# Patient Record
Sex: MALE | Race: WHITE | URBAN - METROPOLITAN AREA
[De-identification: names, ages, dates, MRNs, and addresses within clinical notes are randomized per-mention and may not be internally consistent; named-entity substitution may affect disease eponyms.]

---

## 2019-09-04 ENCOUNTER — DOCUMENTATION ONLY (OUTPATIENT)
Dept: CARE COORDINATION | Facility: CLINIC | Age: 69
End: 2019-09-04

## 2019-09-08 PROBLEM — M79.671 PAIN IN RIGHT FOOT: Status: ACTIVE | Noted: 2018-04-04

## 2019-09-08 PROBLEM — M72.2 PLANTAR FASCIITIS: Status: ACTIVE | Noted: 2018-04-04

## 2019-09-08 PROBLEM — M47.812 SPONDYLOSIS OF CERVICAL REGION WITHOUT MYELOPATHY OR RADICULOPATHY: Status: ACTIVE | Noted: 2017-10-14

## 2019-09-08 RX ORDER — HYDROCODONE BITARTRATE AND ACETAMINOPHEN 5; 325 MG/1; MG/1
1 TABLET ORAL EVERY 6 HOURS PRN
COMMUNITY
Start: 2019-09-06 | End: 2019-09-19

## 2019-09-08 RX ORDER — BUPROPION HYDROCHLORIDE 300 MG/1
150 TABLET ORAL EVERY MORNING
COMMUNITY
Start: 2018-03-27

## 2019-09-08 RX ORDER — DEXTROAMPHETAMINE SACCHARATE, AMPHETAMINE ASPARTATE, DEXTROAMPHETAMINE SULFATE AND AMPHETAMINE SULFATE 5; 5; 5; 5 MG/1; MG/1; MG/1; MG/1
TABLET ORAL
COMMUNITY
Start: 2016-05-02 | End: 2019-09-19

## 2019-09-08 RX ORDER — BUPROPION HYDROCHLORIDE 150 MG/1
150 TABLET ORAL EVERY MORNING
COMMUNITY
Start: 2015-01-23

## 2019-09-08 RX ORDER — OXYBUTYNIN CHLORIDE 10 MG/1
TABLET, EXTENDED RELEASE ORAL
COMMUNITY
Start: 2018-12-11

## 2019-09-08 RX ORDER — DEXTROAMPHETAMINE SACCHARATE, AMPHETAMINE ASPARTATE, DEXTROAMPHETAMINE SULFATE AND AMPHETAMINE SULFATE 7.5; 7.5; 7.5; 7.5 MG/1; MG/1; MG/1; MG/1
1 TABLET ORAL
COMMUNITY
Start: 2019-07-22

## 2019-09-08 RX ORDER — CYANOCOBALAMIN 1000 UG/ML
1000 INJECTION, SOLUTION INTRAMUSCULAR; SUBCUTANEOUS
COMMUNITY
Start: 2017-08-23 | End: 2019-09-19

## 2019-09-08 RX ORDER — ATORVASTATIN CALCIUM 10 MG/1
10 TABLET, FILM COATED ORAL DAILY
COMMUNITY
Start: 2019-03-04 | End: 2020-03-03

## 2019-09-08 RX ORDER — FUROSEMIDE 20 MG
10 TABLET ORAL DAILY
COMMUNITY
Start: 2018-06-18

## 2019-09-08 RX ORDER — LAMOTRIGINE 150 MG/1
200 TABLET ORAL DAILY
COMMUNITY
Start: 2018-01-25

## 2019-09-08 RX ORDER — PHENAZOPYRIDINE HYDROCHLORIDE 100 MG/1
TABLET, FILM COATED ORAL
COMMUNITY
Start: 2018-09-04

## 2019-09-08 RX ORDER — ASCORBIC ACID 500 MG
500 TABLET ORAL DAILY
COMMUNITY
Start: 2016-01-13

## 2019-09-08 RX ORDER — VITAMIN B COMPLEX
1 CAPSULE ORAL
COMMUNITY
End: 2019-09-19

## 2019-09-08 RX ORDER — FOLIC ACID 1 MG/1
TABLET ORAL
COMMUNITY
Start: 2018-12-29

## 2019-09-08 RX ORDER — ROPINIROLE 1 MG/1
TABLET, FILM COATED ORAL
COMMUNITY
Start: 2018-03-27

## 2019-09-08 RX ORDER — FERROUS SULFATE 325(65) MG
65 TABLET ORAL
COMMUNITY
Start: 2015-02-09 | End: 2019-09-19

## 2019-09-08 RX ORDER — FEXOFENADINE HCL 180 MG/1
TABLET ORAL
COMMUNITY
Start: 2018-06-02

## 2019-09-08 NOTE — PROGRESS NOTES
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 Kerr - presumed related to neuropathy  Not clear if he is deficient in vitamin B12 - 259 was his level 2/11/2019 which is normal but low normal.     Restless legs syndrome.    History of alcohol issues    He has fatigue/lack of motivation    He continues to smoke and has had intermittent voice issues - but has not had ENT evaluation  He was encouraged to follow with his pcp    He has past pneumonia and had a followup xray 3/4/2019 that showed clearing of the abnormalities seen January 2019.    Bipolar - and ADHD  Niece Britta Villalobos phone [phone] sets up his medications.     Will need updated list of medications and dosages as we don't have correct information below.     He has presumably tardive movements as they affect his mouth and he has additional movements.     He may have had sleep apnea in the past based on chart review but heis not sure    He has had orthopedic issues - plantar fasciitis and neck surgery.     PLAN  [facility] pharmacy consultation with Susan Vincent to review medication and discuss options like botox vs dopamine depletor, etc.     He has granted access to his records to his Niece Britta Villalobos (sister's daughter) aid in his care as she sets up his medications.     His son, daughter and wife are not involved in his care.     Will  Have to decide if he should remain on the requip 1mg 3/day and if we should try a dopamine depleter    He will get an ECG today to check his QTc     followup to see me or Jessica Dougherty in 3 months.     Psychology referral.     Lucina Darden, Ph.D., L.P. [phone]   Dinorah Paredes, Ph.D., L.P. [phone]   Shereen Shepherd, PhD, [facility] [phone]   Trae Morrison, Ph.D., A.B.P.P., L.P. [phone]   Mary Sosa, Ph.D., L.P. [phone]             Qasim Grajeda " "MD  _____________________________________________________________________  PATIENT: Benson Bosch Young  69 year old male   : 1950  BEVERLY: 2019    Consult requested by pcp/other        Outside records reviewed and revealed  - inserted.       History obtained from patient      History of Present Illness  69 year old  yo with movement problems    Medication reviewed - but unable to confirm all his medications.     Seen by Dr. Kerr, Sugar, etc.     Imaging in the past    Has a psychiatrist Dr. Curt Santo  Not clear if he is taking lamotrigine/lamictal  He is taking probably bupropion at two different doses.     He is still drinking alcohol - but has not drank for 3 weeks.      lives in [place]  daughter is in [place] and son is in [facility]    Dr. Curt Santo, [address], [address], [facility] [address]    Ros  Wears glasses  Denies hearing problems  Smoker  Intermittent voice problems  He wears socks at night  He has sensory ataxia  Has adhd  Neck fusion surgery  Has left plantar fasciitis  Denies alcohol related withdrawal or seizures  He has a psychiatrist.   He has \"bipolar\" and being treated for depression  No family history of RLS  He has been treated for prostate cancer 15 yrs ago and has urinary issues - partly related to diuretics and on bladder medications.   He is not clear if he has heart issues. Denies hear attack  He is taking lasix for ?lower extremity swelling.   He has not had gi problems  He is taking a statin - for high cholesterol  Denies diabetes or thyroid  He has a neuropathy  Denies numbness - has gait problems from his neuropathy  He has fallen a number of times.   He takes vitamin c for preventative  He has a history of vitamin b12 issues but is suppose to have a monthly shots but has been a while since he has obtained on.   He denies allergies.   He is a smoker.   He is taking ropinirole 1mg 3/day  He has RLS   He states his feet move constantly. It is " during the day.   He states he can sleep at night.   He is not awoken by his legs.   A number of years ago he use to wear a hole in his sheets from moving.   He does not know if they move at night now.   He sleeps alone.   He has not been on gabapentin or neurontin in the past for his RLS.     He states he grinds his teeth and his jaw moves and repeats things in his head.     He is taking abilify for 4 yrs (aka aripiprazole) -  It has helped his mood.     He has fatigue and has a lack of ambition.     He has been on other antipsychotics in the past - possibly been on zyprexa.     He had been on depakote in the past.             Medications            Amphetamine-dextroamphetamine adderall 20mg Not taking       Amphetamine-dextroamphetamine adderall 30mg  1       Aripiprazole abilify  1?        Ascorbic acid 500mg  1       Atorvastatin lipitor 10mg         Bupropion wellbutrin XL 150mg 24 hr tablet        Bupropion wellbutrin XL 300mg 24 hr tablet        Cyanocobalami cyanocobalamin 1000mcg/ml injection         Diclofenac voltaren 1% topical gel        Ferrous sulfate ferosul 325 65 Fe) mg tablet         Fexofenadine allegra 180mg         Folic acid folvite 1mg         Furosemide lasix 20mg         Hydrocodone-acetaminophen norco 5-325mg         Lamotrigine lamicta 150mg         MVI         Oxybutynin ER ditropan XL 10mg 24 hr tablet        Phenazopyridine pyridim 100mg         Ropinirole requip 1mg         Vitamin B complex                                               14 Review of systems  are negative except for   Patient Active Problem List   Diagnosis     Acute diarrhea     Acute nasopharyngitis (common cold)     Sinusitis     Acute recurrent maxillary sinusitis     ADHD, predominantly inattentive type     Alcoholism /alcohol abuse (H)     Ataxia     Bipolar disorder (H)     Bladder spasm     Delirium     Gait disorder     Herniation of lumbar intervertebral disc with radiculopathy     Knee pain     Low back  pain     Neck pain     Obstructive sleep apnea     Pain in right foot     Peripheral neuropathy     Panic disorder     Plantar fasciitis     Postural hypotension     Restless leg syndrome     Seasonal allergic rhinitis     Severe bipolar I disorder, current or most recent episode depressed (H)     Spinal stenosis of lumbar region with neurogenic claudication     Spondylolisthesis of lumbar region     Spondylosis of cervical region without myelopathy or radiculopathy     Tobacco abuse     Urinary frequency     Vitamin B 12 deficiency        Allergies   Allergen Reactions     Sulfamethoxazole-Trimethoprim Nausea     Tolterodine Other (See Comments)     PN: nausea      Past Surgical History:   Procedure Laterality Date     KNEE SURGERY Left      NECK SURGERY       PROSTATE SURGERY       No past medical history on file.  Social History     Socioeconomic History     Marital status:      Spouse name: Not on file     Number of children: Not on file     Years of education: Not on file     Highest education level: Not on file   Occupational History     Not on file   Social Needs     Financial resource strain: Not on file     Food insecurity:     Worry: Not on file     Inability: Not on file     Transportation needs:     Medical: Not on file     Non-medical: Not on file   Tobacco Use     Smoking status: Current Every Day Smoker     Smokeless tobacco: Never Used   Substance and Sexual Activity     Alcohol use: Not Currently     Drug use: Not on file     Sexual activity: Not on file   Lifestyle     Physical activity:     Days per week: Not on file     Minutes per session: Not on file     Stress: Not on file   Relationships     Social connections:     Talks on phone: Not on file     Gets together: Not on file     Attends Tenriism service: Not on file     Active member of club or organization: Not on file     Attends meetings of clubs or organizations: Not on file     Relationship status: Not on file     Intimate partner  violence:     Fear of current or ex partner: Not on file     Emotionally abused: Not on file     Physically abused: Not on file     Forced sexual activity: Not on file   Other Topics Concern     Not on file   Social History Narrative    . lives in [place]     Family History   Problem Relation Age of Onset     Anxiety Disorder Mother      Depression Mother      Alzheimer Disease Father      Other - See Comments Sister      Other - See Comments Brother      Other - See Comments Sister      Other - See Comments Daughter      Other - See Comments Son      Current Outpatient Medications   Medication Sig Dispense Refill     amphetamine-dextroamphetamine (ADDERALL) 20 MG tablet TAKE ONE TABLET BY MOUTH TWICE DAILY       amphetamine-dextroamphetamine (ADDERALL) 30 MG tablet 1 tablet       ascorbic acid 500 MG TABS Take 500 mg by mouth       atorvastatin (LIPITOR) 10 MG tablet Take 10 mg by mouth       buPROPion (WELLBUTRIN XL) 150 MG 24 hr tablet Take 150 mg by mouth 3000 + 150mg = 450mg today per day       buPROPion (WELLBUTRIN XL) 300 MG 24 hr tablet Take 150 mg by mouth       cyanocobalamin (CYANOCOBALAMIN) 1000 MCG/ML injection Inject 1,000 mcg into the muscle every 30 days       diclofenac (VOLTAREN) 1 % topical gel Place 4 g onto the skin       ferrous sulfate (FEROSUL) 325 (65 Fe) MG tablet Take 65 mg by mouth       fexofenadine (ALLEGRA) 180 MG tablet TAKE ONE TABLET BY MOUTH DAILY AS NEEDED       folic acid (FOLVITE) 1 MG tablet TAKE ONE TABLET BY MOUTH ONE TIME DAILY       furosemide (LASIX) 20 MG tablet Take 10 mg by mouth       HYDROcodone-acetaminophen (NORCO) 5-325 MG tablet Take 1 tablet by mouth       lamoTRIgine (LAMICTAL) 150 MG tablet Take 200 mg by mouth       MULTIPLE MINERALS-VITAMINS PO Take 1 tablet by mouth every 24 hours       oxybutynin ER (DITROPAN-XL) 10 MG 24 hr tablet TAKE ONE TABLET BY MOUTH ONE TIME DAILY       phenazopyridine (PYRIDIUM) 100 MG tablet TAKE ONE TABLET BY MOUTH THREE  TIMES DAILY AS NEEDED FOR PAIN       rOPINIRole (REQUIP) 1 MG tablet 1mg by  Mouth 3/day       ARIPIPRAZOLE PO        vitamin (B COMPLEX) capsule Take 1 capsule by mouth       Examination  B/P: Data Unavailable, T: Data Unavailable, P: Data Unavailable, R: Data Unavailable 0 lbs 0 oz  There were no vitals taken for this visit., There is no height or weight on file to calculate BMI.    Vitals signs were added and reviewed if not above. Please refer to the chart from this visit.    General examination: well developed, nourished and normal affect  Carotid: No bruits. Chest CTA, Heart regular without gallops or murmurs. Abdomen soft nontender, no masses, bowel sounds intact. Periphery: normal pulses without edema. No skin lesions. MENTAL STATUS:  Alert, oriented x3.  Speech fluent with normal naming, repetition, comprehension.  Good right-left orientation, Can remember 1/3 objects.  5/5 on spelling world backwards. CRANIAL NERVES:  Disks flat. Pupils are equal, round, reactive to light.  Normal vascularity and fields. Extraocular movements full.  Facial sensation and movement normal.  Hearing intact. Palate moves symmetrically.  Tongue midline.  Sternocleidomastoid and trapezius strength intact.  Neck strength was normal.  NEUROLOGIC:  Tone: normal. Motor in upper and lower extremities. 5/5.  Reflexes 3/4.  Toe signs downgoing.  Good finger-nose-finger, fine finger movement, heel-shin maneuver, sensation to light touch, position sense and vibration and temperature was abnormal.  LOSS Of pp and temperature around ankle with absent vibration at the toes and reduced at ankles.  Gait normal except for widened base. Romberg and postural stability  - impairment on the pull test 3-4 step recovery.  Tremor absent.             MR Angio Head WO IV Cont2/3/2015  formerly [facility]  Result Impression   IMPRESSION:  No evidence of intracranial aneurysm or stenosis.  No substantial interval change.   Result Narrative       INDICATION:  frontal headache, fall     TECHNIQUE: Time-of-flight MRA of the [place].     COMPARISON: 08/21/2012.      FINDINGS:     Distal internal carotid arteries: Patent and nonstenotic.     Anterior cerebral arteries: Patent and nonstenotic.     Middle cerebral arteries: Patent and nonstenotic.     Posterior cerebral arteries: Patent and nonstenotic.     Vertebrobasilar system: Patent and nonstenotic.    Other Result Information   Interface, In  Radiology Results - 06/19/2016  5:49 AM CDT    INDICATION: frontal headache, fall     TECHNIQUE: Time-of-flight MRA of the [place].     COMPARISON: 08/21/2012.      FINDINGS:     Distal internal carotid arteries: Patent and nonstenotic.     Anterior cerebral arteries: Patent and nonstenotic.     Middle cerebral arteries: Patent and nonstenotic.     Posterior cerebral arteries: Patent and nonstenotic.     Vertebrobasilar system: Patent and nonstenotic.     IMPRESSION  IMPRESSION:  No evidence of intracranial aneurysm or stenosis.  No substantial interval change.     MR Angio Neck W/WO IV Cont2/3/2015  [facility]  Result Impression   IMPRESSION:  Negative MR angiogram of the neck without and with IV contrast. No substantial interval change when allowing for significant improvement in technique on today's study.   Result Narrative       INDICATION: headache, neck pain, fall, frontal head injury      TECHNIQUE:  Time-of-flight and gadolinium bolus MR angiogram of the neck, 10 mL GADOBUTROL 7.5 MMOL/7.5 ML (1 MMOL/ML) INTRAVENOUS SOLUTION.       COMPARISON:  08/21/2012.       FINDINGS:     Arch:  Normal arch anatomy.  Visualized aortic arch and brachiocephalic vessels are patent and non-stenotic.      Right Neck:     The common carotid artery, internal carotid artery, external carotid artery, and vertebral artery appear patent and nonstenotic.    Left Neck:     The common carotid artery, internal carotid artery, external carotid artery, and vertebral artery appear  patent and nonstenotic.         Other Result Information   Interface, In  Radiology Results - 06/19/2016  5:49 AM CDT    INDICATION: headache, neck pain, fall, frontal head injury      TECHNIQUE:  Time-of-flight and gadolinium bolus MR angiogram of the neck, 10 mL GADOBUTROL 7.5 MMOL/7.5 ML (1 MMOL/ML) INTRAVENOUS SOLUTION.       COMPARISON:  08/21/2012.       FINDINGS:     Arch:  Normal arch anatomy.  Visualized aortic arch and brachiocephalic vessels are patent and non-stenotic.      Right Neck:     The common carotid artery, internal carotid artery, external carotid artery, and vertebral artery appear patent and nonstenotic.    Left Neck:     The common carotid artery, internal carotid artery, external carotid artery, and vertebral artery appear patent and nonstenotic.          IMPRESSION  IMPRESSION:  Negative MR angiogram of the neck without and with IV contrast. No substantial interval change when allowing for significant improvement in technique on today's study.   Status       [facility]  [facility]   IMPRESSION:  No interval change when compared to examination of 08/15/2014.    Result Narrative       INDICATION: Headache frontal.      TECHNIQUE:  MRI of the head with and without contrast using tumor protocol, 10 mL GADOBUTROL 7.5 MMOL/7.5 ML (1 MMOL/ML) INTRAVENOUS SOLUTION.    COMPARISON: 08/15/2014.     FINDINGS:  Normal diffusion. Stable foci of T2 and FLAIR signal hyperintensity within the white matter bilaterally. Stable mild to moderate cerebral and mild cerebellar volume loss. Normal flow voids within the major intracranial vessels. Normal   enhancement. Stable mild inflammatory changes involving mastoid air cells.   Other Result Information   Interface, In  Radiology Results - 06/19/2016  5:49 AM CDT    INDICATION: Headache frontal.      TECHNIQUE:  MRI of the head with and without contrast using tumor protocol, 10 mL GADOBUTROL 7.5 MMOL/7.5 ML (1  "MMOL/ML) INTRAVENOUS SOLUTION.    COMPARISON: 08/15/2014.     FINDINGS:  Normal diffusion. Stable foci of T2 and FLAIR signal hyperintensity within the white matter bilaterally. Stable mild to moderate cerebral and mild cerebellar volume loss. Normal flow voids within the major intracranial vessels. Normal   enhancement. Stable mild inflammatory changes involving mastoid air cells.    IMPRESSION  IMPRESSION:  No interval          Patient Demographics  - 69 y.o. Male; born [birth year]     Patient Address Communication Language Race / Ethnicity   [address] [place] [address] [place] [address] [phone] (Mobile)  [phone] (Home) English (Preferred) Unknown / Unknown   Source Comments  - [facility]    You are receiving this document as you are listed as the primary care provider,follow-up provider, or the patient has been referred to you for consultation.This is in compliance with the Medicare and Medicaid EHR Incentive Program,which states \"Providers who transition their patient to another setting of careor provider of care or refers their patient to another provider of care shouldprovide summary care record for each transition of care or referral.\"  Allergies  Reconcile with Patient's Chart    Active Allergy Reactions Severity Noted Date Comments   Sulfamethoxazole-Trimethoprim Nausea Medium 05/08/2012     Tolterodine Other, see comments Medium 05/08/2012 PN: nausea      Medications  Reconcile with Patient's Chart    Medication Sig Dispensed Refills Start Date End Date Status   buPROPion (WELLBUTRINXL) 150 MG 24 hour release tablet   Take 150 mg by mouth daily. Take with bupropion  mg for a total of 450 mg daily 90 tablet   0 01/23/2015   Active   IRON OR   Take 65 mg by mouth daily. Reported on 5/8/2017   0 02/09/2015   Active   ascorbic acid (VITAMINC) 500 MG tablet   Take 500 mg by mouth two times a day. Reported on 4/10/2017   0 01/13/2016   Active   Multiple Vitamins-Minerals (MULTIVITAMIN " ADULT OR)   Take 1 tablet by mouth daily (every 24 hours).   0 01/13/2016   Active   amphetamine-dextroamphetamine (ADDERALL) 20 MG tablet   TAKE ONE TABLET BY MOUTH TWICE DAILY  60 tablet   0 05/02/2016   Active       Additional information  Patient not taking. Reported on 9/6/2019 6:10 PM     B Complex Vitamins capsule   Take 1 Cap by mouth daily.   0     Active   rOPINIRole (REQUIP) 1 MG tablet   TAKE ONE TABLET BY MOUTH THREE TIMES DAILY  270 Tab   3 03/27/2018   Active   buPROPion (WELLBUTRIN XL) 300 MG 24 hour release tablet   Take 150 mg by mouth daily. Take with 150 mg for a total of 450 mg daily   0 03/27/2018   Active   lamoTRIgine (LAMICTAL) 150 MG tablet   Take 200 mg by mouth daily. Reduced to 200mg A week ago   0 01/25/2018   Active   fexofenadine (ALLEGRA) 180 MG tablet   TAKE ONE TABLET BY MOUTH DAILY AS NEEDED  90 Tab   2 06/02/2018   Active   furosemide (LASIX) 20 MG tablet   Take 0.5 Tabs by mouth daily. 45 Tab   3 06/18/2018   Active   phenazopyridine (PYRIDIUM) 100 MG tablet   TAKE ONE TABLET BY MOUTH THREE TIMES DAILY AS NEEDED FOR PAIN  50 Tablet   4 09/04/2018   Active   oxybutynin (DITROPANXL) 10 MG 24 hour release tablet   TAKE ONE TABLET BY MOUTH ONE TIME DAILY  60 Tablet   10 12/11/2018   Active   folic acid 1 MG tablet   TAKE ONE TABLET BY MOUTH ONE TIME DAILY  90 Tablet   2 12/29/2018   Active   ARIPiprazole (ABILIFY OR)       0     Active   atorvastatin (LIPITOR) 10 MG tablet   Take 1 Tablet by mouth daily. 90 Tablet   3 03/04/2019 03/03/2020 Active   amphetamine-dextroamphetamine (ADDERALL) 30 MG tablet   1 Tablet two times a day.   0 07/22/2019   Active   diclofenac (VOLTAREN) 1 % gel   Apply 4 g to skin 4 times daily as needed (Foot pain). 100 g   0 09/06/2019   Active   HYDROcodone-acetaminophen (NORCO) 5-325 MG tablet   Take 1 Tablet by mouth every 8 hours as needed for Pain. 10 Tablet   0 09/06/2019   Active   HYDROcodone-acetaminophen (NORCO) 5-325 MG tablet   Take 1 Tablet by  mouth every 6 hours as needed for Pain. 10 Tablet   0 08/10/2019 09/06/2019 Discontinued (*Patient decision or formulary issue)     Hospital, Clinic, or Other Facility Administered Medication Ordered Dose Route Frequency Start Date End Date Status   cyanocobalamin ([id number]) injection 1,000 mcg    Indications: Vitamin B 12 deficiency 1000 mcg IM EVERY 30 DAYS 08/23/2017   Active   Active Problems  Reconcile with Patient's Chart    Problem Noted Date   Plantar fasciitis 04/04/2018   Pain in right foot 04/04/2018   Spondylosis of cervical region without myelopathy or radiculopathy 10/14/2017   Sinusitis 10/07/2016   Acute recurrent maxillary sinusitis 10/07/2016   Urinary frequency 02/27/2016   Alcoholism /alcohol abuse 12/21/2015   Delirium 11/26/2015   Peripheral neuropathy 11/26/2015   Panic disorder 09/12/2015   Seasonal allergic rhinitis 09/12/2015   Acute diarrhea 03/09/2015   Neck pain 03/09/2015   Gait disorder 01/12/2015   Vitamin B 12 deficiency 11/05/2013   Acute nasopharyngitis (common cold) 11/04/2013   Ataxia 08/16/2013   Bipolar disorder 08/13/2013   Overview:     Bipolar disorder, unspecified (HRC)     Knee pain 06/06/2013   Bladder spasm 06/06/2013   Postural hypotension 01/28/2013   Obstructive sleep apnea 10/15/2012   Overview:     Mild, AHI 6/hour, RDI 21/hr, Low Sa02 83%, sstudy 7-23-12  AutoPAP 5-15 cmH20, PNic  ; Obstructive sleep apnea (adult) (pediatric)     Restless leg syndrome 07/30/2012   Burning with urination 05/01/2012   Herniation of lumbar intervertebral disc with radiculopathy 11/10/2011   Spinal stenosis of lumbar region with neurogenic claudication 11/10/2011   Spondylolisthesis of lumbar region 11/10/2011   ADHD, predominantly inattentive type 08/24/2011   Tobacco abuse 08/23/2011   Low back pain 08/11/2011   Severe bipolar I disorder, current or most recent episode depressed 07/13/2011   Overview:     Bipolar I disorder, most recent episode (or current) depressed, severe,  without mention of psychotic behavior (HRC)     Resolved Problems      Problem Noted Date Resolved Date   URTI (acute upper respiratory infection) 2014   Bipolar 2 disorder 2011   Encounters  - from Last 3 Months    Date Type Specialty Care Team Description   2019 Hospital Encounter Urgent Care Lexis Paul DO   Plantar fasciitis   2019 Initial Consult Podiatry Timur Neville DPM   Plantar fasciitis, left (Primary Dx)   08/10/2019 Ancillary Procedure Radiology PN       08/10/2019 Hospital Encounter Urgent Care Guillermina Saunders MD   Left foot pain   Immunizations  Reconcile with Patient's Chart    Name Administration Dates Next Due   Influenza IIV3 (Trivalent) Fluzone Highdose, 65+ Yrs (31861) 2017     PCV13 (Prevnar) 2017     Tdap 2013     Surgical History      Surgery Date Site/Laterality Comments   PROSTATE SURGERY          CERVICAL SPINE SURGERY          KNEE SURGERY     left     Medical History      Medical History Date Comments   Cancer ([facility])  prostate   Urinary frequency 2016     Brain injuries ([facility])      Family History      Medical History Relation Name Comments   Alzheimer's Birth Father       Cancer Birth Father   prostate   Anxiety Birth Mother       Depression Birth Mother       No Known Family HIstory Problems Daughter       No Known Family HIstory Problems Son         Relation Name Status Comments   Birth Father        Birth Mother        Daughter   Alive     Sister   Alive     Sister   Alive     Son   Alive     Social History      Tobacco Use Types Packs/Day Years Used Date   Current Every Day Smoker Cigarettes 0.33 45     Smokeless Tobacco: Never Used           Tobacco Cessation: Ready to Quit: Yes; Counseling Given: Yes  Comments: Smoking History Packs/day:     Alcohol Use Drinks/Week std. drinks/Week Comments   No     none     Sex Assigned at Birth Date Recorded   Not on file       Job Start Date  Occupation Industry   Not on file Not on file Not on file     Travel History Travel Start Travel End   No recent travel history available.             Formatting of this note might be different from the original.  Subjective:     Patient ID: Benson Renae is a 67 y.o. male.    Chief Complaint: ataxia, neuropathy  HPI  THis 66 yo pt is seen for unclear reasons as an urgent consult for an episode of confusion. He has been followed by Dr. Wooten in our group for these problems since about 2013, had EMG-documented sensory axonal neuropathy, felt likely related to nutritional aberrations related to daily alcohol use, with B12 deficiency as one identifiable and treatable cause. More recently, he has developed some mild cognitive impairment. He most recently saw Dr. Wooten a little over a year ago, and so is overdue for followup with him. He is on multiple CNS-active drugs for what is called bipolar disorder: Adderall, Abilify, Wellbutrin, clonazepam, lamictal, and Requip (for restless legs).     He says that he had an episode of confusion a week ago, which he relates to having not been eating and drinking well for a period of time, and had allergies or something and then he started eating and drinking again and was fine. In general, he says his appetite has been poor of late (although his weight is the same that it has been for at least the last several years). The sister that is with him today saw him the next day and thought that he was OK, had been drinking more water by then. He saw the PCP the next day, Dr. Munson, who told him to see neurology urgently and stopped a couple of his medications (Flomax and Ditropan), started him back on B12 injections, and asked him to see his psychiatrist to try to consolidate some of his psych meds. He has bipolar disorder, has not had manic periods for a long time, but is more sluggish, generally tired.     His RLS is worse when he is sick. He grinds his teeth too. He lives in an apt  alone with family setting up his meds. 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 He manages his own money and drives, has no trouble driving. He smokes 1 pack every 2-3 days. He does not drink currently for the last 6 months. He had a DUI 10 years ago and so only drinks at home. He has been working with his psychiatrist to reduce some of his medications, but did not tolerate a reduction in Wellbutrin.     Patient Active Problem List   Diagnosis     Severe bipolar I disorder, current or most recent episode depressed ([facility])     Low back pain ([facility])     Tobacco abuse ([facility])     ADHD, predominantly inattentive type ([facility])     Herniation of lumbar intervertebral disc with radiculopathy ([facility])     Spinal stenosis of lumbar region with neurogenic claudication     Spondylolisthesis of lumbar region     Burning with urination     Restless leg syndrome     Obstructive sleep apnea     Postural hypotension     Knee pain     Bladder spasm     Bipolar disorder ([facility]C)     Ataxia     Acute nasopharyngitis (common cold)     Vitamin B 12 deficiency ([facility])     Gait disorder     Acute diarrhea     Neck pain     Panic disorder ([facility])     Seasonal allergic rhinitis     Alcoholism /alcohol abuse ([facility])     Urinary frequency     Delirium     Peripheral neuropathy     Sinusitis     Acute recurrent maxillary sinusitis     Spondylosis of cervical region without myelopathy or radiculopathy ([facility]C)     Plantar fasciitis     Pain in right foot     Outpatient Medications Prior to Visit   Medication Sig Dispense Refill     amphetamine-dextroamphetamine (ADDERALL) 20 MG tablet TAKE ONE TABLET BY MOUTH TWICE DAILY 60 tablet 0     ARIPiprazole (ABILIFY) 10 MG tablet Take 1 Tab by mouth daily. Reported on 3/8/2017 30 Tab 6     ascorbic acid (VITAMINC) 500 MG tablet Take 500 mg by mouth two times a day. Reported on 4/10/2017     B Complex Vitamins capsule Take 1 Cap by mouth daily.      buPROPion (WELLBUTRIN XL) 300 MG 24 hour release tablet Take 150 mg by mouth daily. Take with 150 mg for a total of 450 mg daily     buPROPion (WELLBUTRINXL) 150 MG 24 hour release tablet Take 150 mg by mouth daily. Take with bupropion  mg for a total of 450 mg daily 90 tablet 0     clonazePAM (KLONOPIN) 0.5 MG tablet Take 0.5 mg by mouth daily at bedtime.     fexofenadine (ALLEGRA) 180 MG tablet TAKE ONE TABLET BY MOUTH DAILY AS NEEDED 90 Tab 2     fluticasone (FLONASE) 50 MCG/ACT nasal solution Place 2 Sprays into both nostrils daily. (Patient not taking: Reported on 7/2/2018) 16 g 11     folic acid 1 MG tablet Take 1 Tab by mouth daily. 90 Tab 3     furosemide (LASIX) 20 MG tablet Take 0.5 Tabs by mouth daily. 45 Tab 3     Ibuprofen-Diphenhydramine HCl (ADVIL PM) 200-25 MG Take 2 Tabs by mouth daily at bedtime.     IRON OR Take 65 mg by mouth daily. Reported on 5/8/2017     lamoTRIgine (LAMICTAL) 150 MG tablet Take 300 mg by mouth daily.     Multiple Vitamins-Minerals (MULTIVITAMIN ADULT OR) Take 1 tablet by mouth daily (every 24 hours).     phenazopyridine (PYRIDIUM) 100 MG tablet Take 1 Tab by mouth three times a day as needed for Pain (bladder pain). 50 Tab 5     rOPINIRole (REQUIP) 1 MG tablet TAKE ONE TABLET BY MOUTH THREE TIMES DAILY (Patient taking differently: Taking 1 tablet by mouth at night) 270 Tab 3     Facility-Administered Medications Prior to Visit   Medication Dose Route Frequency Provider Last Rate Last Dose     cyanocobalamin ([id number]) injection 1,000 mcg 1,000 mcg Intramuscular Q30 Days Emerson Munson MD 1,000 mcg at 06/18/18 1703     Family History   Problem Relation Age of Onset     Alzheimer's Birth Father     Cancer Birth Father   prostate     Depression Birth Mother     Anxiety Birth Mother     No Known Family HIstory Problems Daughter     No Known Family HIstory Problems Son     Social History   Substance Use Topics     Smoking status: Current Every Day Smoker   Packs/day:  0.33   Years: 45.00   Types: Cigarettes     Smokeless tobacco: Never Used   Comment: Smoking History Packs/day:     Alcohol use No   Comment: none     Review of Systems    Objective:   Physical Exam  /70 (BP Location: Right Arm, BP Cuff Size: Adult Regular)  Pulse 84  Resp 12  The heart showed a regular rhythm without murmurs, rubs, or gallops.  There were no carotid bruits.  There was no peripheral edema.  The patient appeared, in general, clean, matter of fact, smells of tobacco  No tremor, normal finger to nose and hand movements  Gait is minimally wide-based, and he has trouble doing tandem gait; no balance troubles on routine gait  Reflexes are 2+ and symmetric throughout except absent ankle jerks  He provided most of his own history, knew the names of his medications, where he worked, what he does, etc.     Assessment:     ICD-10-CM   1. Ataxia R27.0   2. Restless leg syndrome G25.81   3. Confusion, hx of, without neuro findings Z86.59     He had what sounds like a confusional episode, somewhat vaguely described, but which seemed to respond to hydration and food, and which, by his telling, was related to having not eaten and drunk well for a couple of days, and maybe taking some kind of allergy medicine. He seems back to baseline now. I agree with Dr. Munson that it is an opportune time to consider reducing some of his many CNS-active meds and get him restarted on his B12. I don't have a strong sense of any need for further workup to look for any new or undiagnosed neurological problems. We did reiterate the importance of good nutrition, fluid intake, exercise, and rest. He can follow up in general with Dr. Wooten as needed, and is working with his psychiatrist on his mental health meds. The ropinirole dose for RLS is just at bedtime, so should not be causing daytime drowsiness or other concerns, could be continued at the current dose indefinitely.   Plan:     See above. 25 mi tt, 15 min  discussion    Copy: Dr. Curt Santo, [address], [address], [facility] [address]        Electronically signed by Sanjuana Kerr MD at 07/02/2018 10:38 AM CDT          MR Angio Neck W/WO IV Cont8/21/2012  [facility]  Result Impression   IMPRESSION:  1. No evidence of a significant stenosis involving the cervical   segments of either internal carotid artery.   2. Patent vertebral basilar system.   3. Poor visualization of the origins of the vertebral arteries and   proximal left subclavian artery felt to be secondary to a combination   of motion artifact and timing of the bolus.  Stenoses in these areas   could not be excluded.  If clinically indicated consider reattempt at   gadolinium MRA for better evaluation of the origins of the great   vessels.     4.  Intracranial stenoses as described above.   Result Narrative     MRA of the neck    TECHNIQUE: 2-D and 3-D images of the neck without contrast were done.     FINDINGS: The 2-D images are mildly to moderately degraded by motion   artifact.  The 3-D images are less degraded by motion artifact.    There is no evidence of a significant stenosis involving either of   the proximal internal carotid arteries.  The cervical segments of   both vertebral arteries appear patent within the neck.  The left   vertebral artery is dominant.     Contrast MRA    TECHNIQUE: Routine contrast MRA was done.  10 mL of Gadavist was   injected intravenously.     FINDINGS: The images are degraded by motion artifact.  This is most   pronounced at the level of the aortic arch.  There is no evidence of   a significant stenosis involving the cervical segments of either   internal carotid artery.  The cervical segments of both vertebral   arteries appear patent within the neck.  There is absence of signal   seen at the origins of the vertebral arteries and proximal left   subclavian artery felt to be most likely secondary to timing of the   bolus.  Stenoses in these areas could not  be excluded.  The common   carotid arteries appear patent.  Long segment mild stenosis versus   artifact related to the timing of the bolus is seen involving the   proximal common carotid artery on the left.  The innominate artery on   the right appears patent.     MRA of the head    TECHNIQUE: Routine MRA of the head without contrast was done.     FINDINGS: The images are mildly degraded by motion artifact.   Vertebral basilar system: The intracranial vertebral arteries and   basilar artery appear patent.  The left vertebral artery is dominant.   Internal carotid arteries: The superior cervical, petrous, cavernous   and supraclinoid segments of both internal carotid arteries appear   patent.  The carotid siphons appear dolichoectatic.  Mild to moderate   stenoses versus loss of signal related to motion artifact and skull   base artifact the knee involving the mid portions of the carotid   siphons.    Middle cerebral arteries: The middle cerebral arteries appear patent.    Mild to moderate stenoses versus motion artifact are seen involving   the distal branches of both middle cerebral arteries.    Anterior cerebral arteries: The anterior cerebral arteries appear   patent.  Mild to moderate stenoses versus motion artifact seen   involving the A2 segments of both anterior cerebral arteries.  Motion   artifact is favored.   Posterior cerebral arteries: There is loss of signal seen involving   the very distal branches of both posterior cerebral arteries which   may be is secondary to areas of stenosis and/or artifact.  This is at   the periphery of the study.     Other Result Information   Interface, In  Radiology Results - 06/18/2016  3:12 AM CDT    MRA of the neck    TECHNIQUE: 2-D and 3-D images of the neck without contrast were done.     FINDINGS: The 2-D images are mildly to moderately degraded by motion   artifact.  The 3-D images are less degraded by motion artifact.    There is no evidence of a significant  stenosis involving either of   the proximal internal carotid arteries.  The cervical segments of   both vertebral arteries appear patent within the neck.  The left   vertebral artery is dominant.     Contrast MRA    TECHNIQUE: Routine contrast MRA was done.  10 mL of Gadavist was   injected intravenously.     FINDINGS: The images are degraded by motion artifact.  This is most   pronounced at the level of the aortic arch.  There is no evidence of   a significant stenosis involving the cervical segments of either   internal carotid artery.  The cervical segments of both vertebral   arteries appear patent within the neck.  There is absence of signal   seen at the origins of the vertebral arteries and proximal left   subclavian artery felt to be most likely secondary to timing of the   bolus.  Stenoses in these areas could not be excluded.  The common   carotid arteries appear patent.  Long segment mild stenosis versus   artifact related to the timing of the bolus is seen involving the   proximal common carotid artery on the left.  The innominate artery on   the right appears patent.     MRA of the head    TECHNIQUE: Routine MRA of the head without contrast was done.     FINDINGS: The images are mildly degraded by motion artifact.   Vertebral basilar system: The intracranial vertebral arteries and   basilar artery appear patent.  The left vertebral artery is dominant.   Internal carotid arteries: The superior cervical, petrous, cavernous   and supraclinoid segments of both internal carotid arteries appear   patent.  The carotid siphons appear dolichoectatic.  Mild to moderate   stenoses versus loss of signal related to motion artifact and skull   base artifact the knee involving the mid portions of the carotid   siphons.    Middle cerebral arteries: The middle cerebral arteries appear patent.    Mild to moderate stenoses versus motion artifact are seen involving   the distal branches of both middle cerebral arteries.     Anterior cerebral arteries: The anterior cerebral arteries appear   patent.  Mild to moderate stenoses versus motion artifact seen   involving the A2 segments of both anterior cerebral arteries.  Motion   artifact is favored.   Posterior cerebral arteries: There is loss of signal seen involving   the very distal branches of both posterior cerebral arteries which   may be is secondary to areas of stenosis and/or artifact.  This is at   the periphery of the study.      IMPRESSION  IMPRESSION:  1. No evidence of a significant stenosis involving the cervical   segments of either internal carotid artery.   2. Patent vertebral basilar system.   3. Poor visualization of the origins of the vertebral arteries and   proximal left subclavian artery felt to be secondary to a combination   of motion artifact and timing of the bolus.  Stenoses in these areas   could not be excluded.  If clinically indicated consider reattempt at   gadolinium MRA for better evaluation of the origins of the great   vessels.     4.  Intracranial stenoses as described above.     MR Angio [place] WO IV Cont8/21/2012  [facility]  Result Impression   IMPRESSION:  1. No evidence of a significant stenosis involving the cervical   segments of either internal carotid artery.   2. Patent vertebral basilar system.   3. Poor visualization of the origins of the vertebral arteries and   proximal left subclavian artery felt to be secondary to a combination   of motion artifact and timing of the bolus.  Stenoses in these areas   could not be excluded.  If clinically indicated consider reattempt at   gadolinium MRA for better evaluation of the origins of the great   vessels.     4.  Intracranial stenoses as described above.   Result Narrative     MRA of the neck    TECHNIQUE: 2-D and 3-D images of the neck without contrast were done.     FINDINGS: The 2-D images are mildly to moderately degraded by motion   artifact.  The 3-D images are less degraded  by motion artifact.    There is no evidence of a significant stenosis involving either of   the proximal internal carotid arteries.  The cervical segments of   both vertebral arteries appear patent within the neck.  The left   vertebral artery is dominant.     Contrast MRA    TECHNIQUE: Routine contrast MRA was done.  10 mL of Gadavist was   injected intravenously.     FINDINGS: The images are degraded by motion artifact.  This is most   pronounced at the level of the aortic arch.  There is no evidence of   a significant stenosis involving the cervical segments of either   internal carotid artery.  The cervical segments of both vertebral   arteries appear patent within the neck.  There is absence of signal   seen at the origins of the vertebral arteries and proximal left   subclavian artery felt to be most likely secondary to timing of the   bolus.  Stenoses in these areas could not be excluded.  The common   carotid arteries appear patent.  Long segment mild stenosis versus   artifact related to the timing of the bolus is seen involving the   proximal common carotid artery on the left.  The innominate artery on   the right appears patent.     MRA of the head    TECHNIQUE: Routine MRA of the head without contrast was done.     FINDINGS: The images are mildly degraded by motion artifact.   Vertebral basilar system: The intracranial vertebral arteries and   basilar artery appear patent.  The left vertebral artery is dominant.   Internal carotid arteries: The superior cervical, petrous, cavernous   and supraclinoid segments of both internal carotid arteries appear   patent.  The carotid siphons appear dolichoectatic.  Mild to moderate   stenoses versus loss of signal related to motion artifact and skull   base artifact the knee involving the mid portions of the carotid   siphons.    Middle cerebral arteries: The middle cerebral arteries appear patent.    Mild to moderate stenoses versus motion artifact are seen  involving   the distal branches of both middle cerebral arteries.    Anterior cerebral arteries: The anterior cerebral arteries appear   patent.  Mild to moderate stenoses versus motion artifact seen   involving the A2 segments of both anterior cerebral arteries.  Motion   artifact is favored.   Posterior cerebral arteries: There is loss of signal seen involving   the very distal branches of both posterior cerebral arteries which   may be is secondary to areas of stenosis and/or artifact.  This is at   the periphery of the study.     Other Result Information   Interface, In  Radiology Results - 06/18/2016  3:12 AM CDT    MRA of the neck    TECHNIQUE: 2-D and 3-D images of the neck without contrast were done.     FINDINGS: The 2-D images are mildly to moderately degraded by motion   artifact.  The 3-D images are less degraded by motion artifact.    There is no evidence of a significant stenosis involving either of   the proximal internal carotid arteries.  The cervical segments of   both vertebral arteries appear patent within the neck.  The left   vertebral artery is dominant.     Contrast MRA    TECHNIQUE: Routine contrast MRA was done.  10 mL of Gadavist was   injected intravenously.     FINDINGS: The images are degraded by motion artifact.  This is most   pronounced at the level of the aortic arch.  There is no evidence of   a significant stenosis involving the cervical segments of either   internal carotid artery.  The cervical segments of both vertebral   arteries appear patent within the neck.  There is absence of signal   seen at the origins of the vertebral arteries and proximal left   subclavian artery felt to be most likely secondary to timing of the   bolus.  Stenoses in these areas could not be excluded.  The common   carotid arteries appear patent.  Long segment mild stenosis versus   artifact related to the timing of the bolus is seen involving the   proximal common carotid artery on the left.  The  innominate artery on   the right appears patent.     MRA of the head    TECHNIQUE: Routine MRA of the head without contrast was done.     FINDINGS: The images are mildly degraded by motion artifact.   Vertebral basilar system: The intracranial vertebral arteries and   basilar artery appear patent.  The left vertebral artery is dominant.   Internal carotid arteries: The superior cervical, petrous, cavernous   and supraclinoid segments of both internal carotid arteries appear   patent.  The carotid siphons appear dolichoectatic.  Mild to moderate   stenoses versus loss of signal related to motion artifact and skull   base artifact the knee involving the mid portions of the carotid   siphons.    Middle cerebral arteries: The middle cerebral arteries appear patent.    Mild to moderate stenoses versus motion artifact are seen involving   the distal branches of both middle cerebral arteries.    Anterior cerebral arteries: The anterior cerebral arteries appear   patent.  Mild to moderate stenoses versus motion artifact seen   involving the A2 segments of both anterior cerebral arteries.  Motion   artifact is favored.   Posterior cerebral arteries: There is loss of signal seen involving   the very distal branches of both posterior cerebral arteries which   may be is secondary to areas of stenosis and/or artifact.  This is at   the periphery of the study.      IMPRESSION  IMPRESSION:  1. No evidence of a significant stenosis involving the cervical   segments of either internal carotid artery.   2. Patent vertebral basilar system.   3. Poor visualization of the origins of the vertebral arteries and   proximal left subclavian artery felt to be secondary to a combination   of motion artifact and timing of the bolus.  Stenoses in these areas   could not be excluded.  If clinically indicated consider reattempt at   gadolinium MRA for better evaluation of the origins of the great   vessels.     4.  Intracranial stenoses as     MR  Brain W/WO IV Cont8/21/2012  [facility]  Result Impression   IMPRESSION:  1. Study degraded by motion artifact.   2. No definite evidence of an acute infarct.   3. Volume loss.   4. Small number of small nonspecific foci of increased signal   involving the brain parenchyma.  Small vessel ischemic disease is   favored.   5. Marked nonspecific decreased T1 signal seen involving the distal   odontoid questionable clinical significance.  This may be related to   sclerotic or degenerative changes.  Consider followup MRI of the   cervical spine.  Finding is of questionable clinical significance.   Result Narrative     TECHNIQUE: An MRI of the brain with and without contrast was done.    10 mL of Gadavist was injected intravenously.     FINDINGS: The images are mildly to moderately degraded by motion   artifact.  There is no definite evidence of an acute infarct or   cytotoxic edema on the diffusion images.  Mild to moderate volume   loss is seen.  Nonspecific marked decreased T1 signal is seen   involving the distal odontoid.  Probable small incidental arachnoid   cyst is seen within the superior vermian cistern.  Small number of   small nonspecific foci of increased signal are seen involving the   subcortical and deep white matter.  Mild mucosal membrane thickening   is seen within the paranasal sinuses.  A few opacified mastoid air   cells are seen.  Probable small mucus retention cysts are seen within   the nasopharynx.  The anterior clinoids are incidentally partially   pneumatized.  The post contrast images appear grossly unremarkable.    Other Result Information   Interface, In  Radiology Results - 06/18/2016  3:12 AM CDT    TECHNIQUE: An MRI of the brain with and without contrast was done.    10 mL of Gadavist was injected intravenously.     FINDINGS: The images are mildly to moderately degraded by motion   artifact.  There is no definite evidence of an acute infarct or   cytotoxic edema on the diffusion  images.  Mild to moderate volume   loss is seen.  Nonspecific marked decreased T1 signal is seen   involving the distal odontoid.  Probable small incidental arachnoid   cyst is seen within the superior vermian cistern.  Small number of   small nonspecific foci of increased signal are seen involving the   subcortical and deep white matter.  Mild mucosal membrane thickening   is seen within the paranasal sinuses.  A few opacified mastoid air   cells are seen.  Probable small mucus retention cysts are seen within   the nasopharynx.  The anterior clinoids are incidentally partially   pneumatized.  The post contrast images appear grossly unremarkable.     IMPRESSION  IMPRESSION:  1. Study degraded by motion artifact.   2. No definite evidence of an acute infarct.   3. Volume loss.   4. Small number of small nonspecific foci of increased signal   involving the brain parenchyma.  Small vessel ischemic disease is   favored.   5. Marked nonspecific decreased T1 signal seen involving the distal   odontoid questionable clinical significance.  This may be related to   sclerotic or degenerative changes.  Consider followup MRI of the   cervical spine.  Finding is of questionable clinical significance.   Status

## 2019-09-10 NOTE — TELEPHONE ENCOUNTER
RECORDS RECEIVED FROM: External - Dr. Oconnor   DATE RECEIVED: 9/19/19   NOTES (FOR ALL VISITS) STATUS DETAILS   OFFICE NOTE from referring provider N/A    OFFICE NOTE from other specialist Care Everywhere [facility]:  7/2/18  3/24/17  9/10/15   DISCHARGE SUMMARY from hospital N/A    DISCHARGE REPORT from the ER Care Everywhere [facility]:  2/3/15   OPERATIVE REPORT N/A    MEDICATION LIST Care Everywhere    IMAGING  (FOR ALL VISITS)     EMG N/A    EEG N/A    ECT N/A    MRI (HEAD, NECK, SPINE) Received [facility]:  MRA Head Neck 2/3/15  MRI Brain 2/3/15   LUMBAR PUNCTURE N/A    MADELINE Scan N/A    CT (HEAD, NECK, SPINE) Received [facility]:  CT Head 2/3/15      Action    Action Taken Imaging request faxed to [facility]

## 2019-09-17 NOTE — TELEPHONE ENCOUNTER
Imaging Received  [facility]   Image Type (x): Disc:   PACS: x   Exam Date/Name MRA Head Neck 2/3/15  MRI Brain 2/3/15  CT Head 2/3/15 Comments: Images resolved in PACS

## 2019-09-19 ENCOUNTER — APPOINTMENT (OUTPATIENT)
Dept: GENERAL RADIOLOGY | Facility: CLINIC | Age: 69
End: 2019-09-19
Attending: PSYCHIATRY & NEUROLOGY
Payer: COMMERCIAL

## 2019-09-19 ENCOUNTER — PRE VISIT (OUTPATIENT)
Dept: NEUROLOGY | Facility: CLINIC | Age: 69
End: 2019-09-19

## 2019-09-19 ENCOUNTER — OFFICE VISIT (OUTPATIENT)
Dept: NEUROLOGY | Facility: CLINIC | Age: 69
End: 2019-09-19
Payer: COMMERCIAL

## 2019-09-19 VITALS — DIASTOLIC BLOOD PRESSURE: 82 MMHG | SYSTOLIC BLOOD PRESSURE: 149 MMHG | OXYGEN SATURATION: 97 % | HEART RATE: 87 BPM

## 2019-09-19 DIAGNOSIS — G25.9 MOVEMENT DISORDER: Primary | ICD-10-CM

## 2019-09-19 DIAGNOSIS — F31.31 BIPOLAR AFFECTIVE DISORDER, CURRENTLY DEPRESSED, MILD (H): ICD-10-CM

## 2019-09-19 DIAGNOSIS — G24.01 TARDIVE DYSKINESIA: ICD-10-CM

## 2019-09-19 DIAGNOSIS — Z97.3 WEARS GLASSES: ICD-10-CM

## 2019-09-19 DIAGNOSIS — R49.9 VOICE DISORDER: ICD-10-CM

## 2019-09-19 DIAGNOSIS — Z98.890 H/O NECK SURGERY: ICD-10-CM

## 2019-09-19 RX ORDER — BENZONATATE 100 MG/1
CAPSULE ORAL
Refills: 0 | COMMUNITY
Start: 2018-12-30 | End: 2019-09-19

## 2019-09-19 RX ORDER — ALBUTEROL SULFATE 90 UG/1
AEROSOL, METERED RESPIRATORY (INHALATION)
Refills: 0 | COMMUNITY
Start: 2018-12-30 | End: 2019-09-19

## 2019-09-19 RX ORDER — CELECOXIB 200 MG/1
CAPSULE ORAL
Refills: 0 | COMMUNITY
Start: 2019-09-17 | End: 2019-09-19

## 2019-09-19 RX ORDER — METHYLPREDNISOLONE 4 MG
TABLET, DOSE PACK ORAL
Refills: 0 | COMMUNITY
Start: 2019-01-03 | End: 2019-09-19

## 2019-09-19 RX ORDER — TRAMADOL HYDROCHLORIDE 50 MG/1
TABLET ORAL
Refills: 0 | COMMUNITY
Start: 2019-09-11

## 2019-09-19 RX ORDER — LAMOTRIGINE 300 MG/1
TABLET, EXTENDED RELEASE ORAL
Refills: 1 | COMMUNITY
Start: 2019-08-12

## 2019-09-19 RX ORDER — NALTREXONE HYDROCHLORIDE 50 MG/1
TABLET, FILM COATED ORAL
Refills: 0 | COMMUNITY
Start: 2019-01-03 | End: 2019-09-19

## 2019-09-19 ASSESSMENT — PATIENT HEALTH QUESTIONNAIRE - PHQ9: SUM OF ALL RESPONSES TO PHQ QUESTIONS 1-9: 15

## 2019-09-19 ASSESSMENT — PAIN SCALES - GENERAL: PAINLEVEL: SEVERE PAIN (6)

## 2019-09-19 NOTE — NURSING NOTE
"[facility]:  PHQ-9 Screening Note    SITUATION/BACKGROUND                                                    Benson Renae is a 69 year old male who completed the PHQ-9 assessment for depression and Score is >9.    Onset of symptoms: gradual over \"the past 25 years\"  Trigger: None  Recent related events: Death of a friend  Prior history of suicide attempt or self harm: No   Risk Factors: age, anxiety, recent loss of friend (lung cancer) and comorbid medical condition of anxiety, tardive dyskinesia \"I look ugly\" RLS, pain.  History of depression or mental illness: Yes  Medications reviewed: Yes     ASSESSMENT      A. Are any of the following present?      Suicidal thoughts with a plan and means to carry out the plan?    Intent to harm others    Altered mental status: confusion, delusional, psychotic NO - go to B   B. Are any of the following present?      Suicidal thoughts without a plan or means to carry out the plan    New onset of delusional ideas    Past inpatient admission for depression    New onset and recent change or addition of new medication   NO - go to C   C. Are any of the following present?      Previous suicide attempts    Depression interfering with ability to work or function    Loss of appetite and eating poorly    Abrupt cessation of drugs (OTC or RX), alcohol or caffeine    Drug or alcohol abuse NO - go to D \"Not so much, I don't think so.\"   D. Are several of the following present?      Difficulty concentrating    Difficulty sleeping    Reduced interest in sexual activity or impotency    Irregular or absent menstruation    No interest in activity    Change in interpersonal relationships    Increased use/abuse of alcohol or drugs    Pregnant or recent child birth    Recent major life change    History of depression YES -  Follow-up with PCP for appointment and follow home care instructions.    Place referral to behavioral health team for \"regular\" follow-up.    NO - " provide home care instructions.   Declined      Dr. Jason timmons (psychiatrist) Phone; [phone]  PLAN      Home Care Instructions:   1. Mr. Renae will follow-up with his psychiatrist and set up an appointment to see a health psychologist here at the [facility]. I offered him the ability to meet or speak with our behavioral Health Clinician sooner as health psychology and psychiatry and take a while to get into. Mr. Renae declined this resource at this time.  2. Mr. Renae stated he is able to speak with his wife and family for support  3. Local crisis numbers given  4. Mr. Renae reported he drinks small amounts at time. I informed him alcohol can worsen depression symptoms.    Seek emergency care immediately if any of the following occur:   Suicidal thoughts and plan and means to carry out the plan    BEHAVIORAL HEALTH TEAMS      [facility] - Behavioral Health Team    [facility] Emergency Department Pager: [phone]    [place]  - Behavioral Health Team    Pager number: [phone]    Referral to Behavioral Health    BEHAVIORAL / SPIRITUAL HEALTH SOWQ [21085}    RESOURCES      - 24/7 Crisis Hotlines: National Suicide Prevention Hotline  696-135-GKIJ (3768)  - Crisis Hotlines by County:  [facility] for Adults: 101.272.9447    Elo Moreno RN    Copyright 2016 [employer]

## 2019-09-19 NOTE — PATIENT INSTRUCTIONS
PLAN  [facility] pharmacy consultation with Dayna Cabello, Pharm to review medication and discuss options like botox vs dopamine depletor, etc.     He has granted access to his records to his Niece Britta Villalobos (sister's daughter) aid in his care as she sets up his medications.     His son, daughter and wife are not involved in his care.     Will  Have to decide if he should remain on the requip 1mg 3/day and if we should try a dopamine depleter    He will get an ECG today to check his QTc     followup to see me or Jessica Dougherty in 3 months.     Psychology referral.     Lucina Darden, Ph.D., L.P. [phone]   Dinorah Paredes, Ph.D., L.P. [phone]   Shereen Shepherd, PhD, [facility] [phone]   Trae Morrison, Ph.D., A.B.P.P., L.P. [phone]   Mary Sosa, Ph.D., L.P. [phone]     - 24/7 Crisis Hotlines: National Suicide Prevention Hotline  762-221-XWFZ (5294)  - Crisis Hotlines by [facility]:  [facility] for [facility]: 232.459.7252

## 2019-09-19 NOTE — LETTER
"9/19/2019       RE: Benson Renae  [address] [place] [address] [address] [place] [address]     Dear Colleague,    Thank you for referring your patient, Benson Renae, to the [facility]. Please see a copy of my visit note below.      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 Kerr - presumed related to neuropathy  Not clear if he is deficient in vitamin B12 - 259 was his level 2/11/2019 which is normal but low normal.     Restless legs syndrome.    History of alcohol issues    He has fatigue/lack of motivation    He continues to smoke and has had intermittent voice issues - but has not had ENT evaluation  He was encouraged to follow with his pcp    He has past pneumonia and had a followup xray 3/4/2019 that showed clearing of the abnormalities seen January 2019.    Bipolar - and ADHD  Niece Britta Villalobos phone [phone] sets up his medications.     Will need updated list of medications and dosages as we don't have correct information below.     He has presumably tardive movements as they affect his mouth and he has additional movements.     He may have had sleep apnea in the past based on chart review but heis not sure    He has had orthopedic issues - plantar fasciitis and neck surgery.     PLAN  [facility] pharmacy consultation with Dayna Cabello, Pharm to review medication and discuss options like botox vs dopamine depletor, etc.     He has granted access to his records to his Niece Britta Villalobos (sister's daughter) aid in his care as she sets up his medications.     His son, daughter and wife are not involved in his care.     Will  Have to decide if he should remain on the requip 1mg 3/day and if we should try a dopamine depleter    He will get an ECG today to check his QTc     followup to see me or Jessica Dougherty in 3 months.     Psychology referral.     Lucina Darden, Ph.D., L.P. [phone] " [phone]   Dinorah Paredes, Ph.D., L.P. [phone]   Shereen Shepherd, PhD, [facility] [phone]   Trae Morrison, Ph.D., A.B.P.P., L.P. [phone]   Mary Sosa, Ph.D., L.P. [phone]             Qasim Grajeda MD  _____________________________________________________________________  PATIENT: Benson Bosch Young  69 year old male   : 1950  BEVERLY: 2019    Consult requested by pcp/other        Outside records reviewed and revealed  - inserted.       History obtained from patient      History of Present Illness  69 year old  yo with movement problems    Medication reviewed - but unable to confirm all his medications.     Seen by Dr. Kerr, Sugar, etc.     Imaging in the past    Has a psychiatrist Dr. Curt Santo  Not clear if he is taking lamotrigine/lamictal  He is taking probably bupropion at two different doses.     He is still drinking alcohol - but has not drank for 3 weeks.      lives in [place]  daughter is in [place] and son is in [facility]    Dr. Curt Santo, [address] [address], [address], [place] [address]    Ros  Wears glasses  Denies hearing problems  Smoker  Intermittent voice problems  He wears socks at night  He has sensory ataxia  Has adhd  Neck fusion surgery  Has left plantar fasciitis  Denies alcohol related withdrawal or seizures  He has a psychiatrist.   He has \"bipolar\" and being treated for depression  No family history of RLS  He has been treated for prostate cancer 15 yrs ago and has urinary issues - partly related to diuretics and on bladder medications.   He is not clear if he has heart issues. Denies hear attack  He is taking lasix for ?lower extremity swelling.   He has not had gi problems  He is taking a statin - for high cholesterol  Denies diabetes or thyroid  He has a neuropathy  Denies numbness - has gait problems from his neuropathy  He has fallen a number of times.   He takes vitamin c for preventative  He has a history of vitamin b12 issues but is " suppose to have a monthly shots but has been a while since he has obtained on.   He denies allergies.   He is a smoker.   He is taking ropinirole 1mg 3/day  He has RLS   He states his feet move constantly. It is during the day.   He states he can sleep at night.   He is not awoken by his legs.   A number of years ago he use to wear a hole in his sheets from moving.   He does not know if they move at night now.   He sleeps alone.   He has not been on gabapentin or neurontin in the past for his RLS.     He states he grinds his teeth and his jaw moves and repeats things in his head.     He is taking abilify for 4 yrs (aka aripiprazole) -  It has helped his mood.     He has fatigue and has a lack of ambition.     He has been on other antipsychotics in the past - possibly been on zyprexa.     He had been on depakote in the past.             Medications            Amphetamine-dextroamphetamine adderall 20mg Not taking       Amphetamine-dextroamphetamine adderall 30mg  1       Aripiprazole abilify  1?        Ascorbic acid 500mg  1       Atorvastatin lipitor 10mg         Bupropion wellbutrin XL 150mg 24 hr tablet        Bupropion wellbutrin XL 300mg 24 hr tablet        Cyanocobalami cyanocobalamin 1000mcg/ml injection         Diclofenac voltaren 1% topical gel        Ferrous sulfate ferosul 325 65 Fe) mg tablet         Fexofenadine allegra 180mg         Folic acid folvite 1mg         Furosemide lasix 20mg         Hydrocodone-acetaminophen norco 5-325mg         Lamotrigine lamicta 150mg         MVI         Oxybutynin ER ditropan XL 10mg 24 hr tablet        Phenazopyridine pyridim 100mg         Ropinirole requip 1mg         Vitamin B complex                                               14 Review of systems  are negative except for   Patient Active Problem List   Diagnosis     Acute diarrhea     Acute nasopharyngitis (common cold)     Sinusitis     Acute recurrent maxillary sinusitis     ADHD, predominantly inattentive type      Alcoholism /alcohol abuse (H)     Ataxia     Bipolar disorder (H)     Bladder spasm     Delirium     Gait disorder     Herniation of lumbar intervertebral disc with radiculopathy     Knee pain     Low back pain     Neck pain     Obstructive sleep apnea     Pain in right foot     Peripheral neuropathy     Panic disorder     Plantar fasciitis     Postural hypotension     Restless leg syndrome     Seasonal allergic rhinitis     Severe bipolar I disorder, current or most recent episode depressed (H)     Spinal stenosis of lumbar region with neurogenic claudication     Spondylolisthesis of lumbar region     Spondylosis of cervical region without myelopathy or radiculopathy     Tobacco abuse     Urinary frequency     Vitamin B 12 deficiency        Allergies   Allergen Reactions     Sulfamethoxazole-Trimethoprim Nausea     Tolterodine Other (See Comments)     PN: nausea      Past Surgical History:   Procedure Laterality Date     KNEE SURGERY Left      NECK SURGERY       PROSTATE SURGERY       No past medical history on file.  Social History     Socioeconomic History     Marital status:      Spouse name: Not on file     Number of children: Not on file     Years of education: Not on file     Highest education level: Not on file   Occupational History     Not on file   Social Needs     Financial resource strain: Not on file     Food insecurity:     Worry: Not on file     Inability: Not on file     Transportation needs:     Medical: Not on file     Non-medical: Not on file   Tobacco Use     Smoking status: Current Every Day Smoker     Smokeless tobacco: Never Used   Substance and Sexual Activity     Alcohol use: Not Currently     Drug use: Not on file     Sexual activity: Not on file   Lifestyle     Physical activity:     Days per week: Not on file     Minutes per session: Not on file     Stress: Not on file   Relationships     Social connections:     Talks on phone: Not on file     Gets together: Not on file      Attends Shinto service: Not on file     Active member of club or organization: Not on file     Attends meetings of clubs or organizations: Not on file     Relationship status: Not on file     Intimate partner violence:     Fear of current or ex partner: Not on file     Emotionally abused: Not on file     Physically abused: Not on file     Forced sexual activity: Not on file   Other Topics Concern     Not on file   Social History Narrative    . lives in [place]     Family History   Problem Relation Age of Onset     Anxiety Disorder Mother      Depression Mother      Alzheimer Disease Father      Other - See Comments Sister      Other - See Comments Brother      Other - See Comments Sister      Other - See Comments Daughter      Other - See Comments Son      Current Outpatient Medications   Medication Sig Dispense Refill     amphetamine-dextroamphetamine (ADDERALL) 20 MG tablet TAKE ONE TABLET BY MOUTH TWICE DAILY       amphetamine-dextroamphetamine (ADDERALL) 30 MG tablet 1 tablet       ascorbic acid 500 MG TABS Take 500 mg by mouth       atorvastatin (LIPITOR) 10 MG tablet Take 10 mg by mouth       buPROPion (WELLBUTRIN XL) 150 MG 24 hr tablet Take 150 mg by mouth 3000 + 150mg = 450mg today per day       buPROPion (WELLBUTRIN XL) 300 MG 24 hr tablet Take 150 mg by mouth       cyanocobalamin (CYANOCOBALAMIN) 1000 MCG/ML injection Inject 1,000 mcg into the muscle every 30 days       diclofenac (VOLTAREN) 1 % topical gel Place 4 g onto the skin       ferrous sulfate (FEROSUL) 325 (65 Fe) MG tablet Take 65 mg by mouth       fexofenadine (ALLEGRA) 180 MG tablet TAKE ONE TABLET BY MOUTH DAILY AS NEEDED       folic acid (FOLVITE) 1 MG tablet TAKE ONE TABLET BY MOUTH ONE TIME DAILY       furosemide (LASIX) 20 MG tablet Take 10 mg by mouth       HYDROcodone-acetaminophen (NORCO) 5-325 MG tablet Take 1 tablet by mouth       lamoTRIgine (LAMICTAL) 150 MG tablet Take 200 mg by mouth       MULTIPLE MINERALS-VITAMINS PO  Take 1 tablet by mouth every 24 hours       oxybutynin ER (DITROPAN-XL) 10 MG 24 hr tablet TAKE ONE TABLET BY MOUTH ONE TIME DAILY       phenazopyridine (PYRIDIUM) 100 MG tablet TAKE ONE TABLET BY MOUTH THREE TIMES DAILY AS NEEDED FOR PAIN       rOPINIRole (REQUIP) 1 MG tablet 1mg by  Mouth 3/day       ARIPIPRAZOLE PO        vitamin (B COMPLEX) capsule Take 1 capsule by mouth       Examination  B/P: Data Unavailable, T: Data Unavailable, P: Data Unavailable, R: Data Unavailable 0 lbs 0 oz  There were no vitals taken for this visit., There is no height or weight on file to calculate BMI.    Vitals signs were added and reviewed if not above. Please refer to the chart from this visit.    General examination: well developed, nourished and normal affect  Carotid: No bruits. Chest CTA, Heart regular without gallops or murmurs. Abdomen soft nontender, no masses, bowel sounds intact. Periphery: normal pulses without edema. No skin lesions. MENTAL STATUS:  Alert, oriented x3.  Speech fluent with normal naming, repetition, comprehension.  Good right-left orientation, Can remember 1/3 objects.  5/5 on spelling world backwards. CRANIAL NERVES:  Disks flat. Pupils are equal, round, reactive to light.  Normal vascularity and fields. Extraocular movements full.  Facial sensation and movement normal.  Hearing intact. Palate moves symmetrically.  Tongue midline.  Sternocleidomastoid and trapezius strength intact.  Neck strength was normal.  NEUROLOGIC:  Tone: normal. Motor in upper and lower extremities. 5/5.  Reflexes 3/4.  Toe signs downgoing.  Good finger-nose-finger, fine finger movement, heel-shin maneuver, sensation to light touch, position sense and vibration and temperature was abnormal.  LOSS Of pp and temperature around ankle with absent vibration at the toes and reduced at ankles.  Gait normal except for widened base. Romberg and postural stability  - impairment on the pull test 3-4 step recovery.  Tremor absent.              MR Angio Head WO IV Cont2/3/2015  [facility]  Result Impression   IMPRESSION:  No evidence of intracranial aneurysm or stenosis.  No substantial interval change.   Result Narrative       INDICATION: frontal headache, fall     TECHNIQUE: Time-of-flight MRA of the [place].     COMPARISON: 08/21/2012.      FINDINGS:     Distal internal carotid arteries: Patent and nonstenotic.     Anterior cerebral arteries: Patent and nonstenotic.     Middle cerebral arteries: Patent and nonstenotic.     Posterior cerebral arteries: Patent and nonstenotic.     Vertebrobasilar system: Patent and nonstenotic.    Other Result Information   Interface, In  Radiology Results - 06/19/2016  5:49 AM CDT    INDICATION: frontal headache, fall     TECHNIQUE: Time-of-flight MRA of the [place].     COMPARISON: 08/21/2012.      FINDINGS:     Distal internal carotid arteries: Patent and nonstenotic.     Anterior cerebral arteries: Patent and nonstenotic.     Middle cerebral arteries: Patent and nonstenotic.     Posterior cerebral arteries: Patent and nonstenotic.     Vertebrobasilar system: Patent and nonstenotic.     IMPRESSION  IMPRESSION:  No evidence of intracranial aneurysm or stenosis.  No substantial interval change.     MR Angio Neck W/WO IV Cont2/3/2015  [facility]  Result Impression   IMPRESSION:  Negative MR angiogram of the neck without and with IV contrast. No substantial interval change when allowing for significant improvement in technique on today's study.   Result Narrative       INDICATION: headache, neck pain, fall, frontal head injury      TECHNIQUE:  Time-of-flight and gadolinium bolus MR angiogram of the neck, 10 mL GADOBUTROL 7.5 MMOL/7.5 ML (1 MMOL/ML) INTRAVENOUS SOLUTION.       COMPARISON:  08/21/2012.       FINDINGS:     Arch:  Normal arch anatomy.  Visualized aortic arch and brachiocephalic vessels are patent and non-stenotic.      Right Neck:     The common carotid artery, internal  carotid artery, external carotid artery, and vertebral artery appear patent and nonstenotic.    Left Neck:     The common carotid artery, internal carotid artery, external carotid artery, and vertebral artery appear patent and nonstenotic.         Other Result Information   Interface, In  Radiology Results - 06/19/2016  5:49 AM CDT    INDICATION: headache, neck pain, fall, frontal head injury      TECHNIQUE:  Time-of-flight and gadolinium bolus MR angiogram of the neck, 10 mL GADOBUTROL 7.5 MMOL/7.5 ML (1 MMOL/ML) INTRAVENOUS SOLUTION.       COMPARISON:  08/21/2012.       FINDINGS:     Arch:  Normal arch anatomy.  Visualized aortic arch and brachiocephalic vessels are patent and non-stenotic.      Right Neck:     The common carotid artery, internal carotid artery, external carotid artery, and vertebral artery appear patent and nonstenotic.    Left Neck:     The common carotid artery, internal carotid artery, external carotid artery, and vertebral artery appear patent and nonstenotic.          IMPRESSION  IMPRESSION:  Negative MR angiogram of the neck without and with IV contrast. No substantial interval change when allowing for significant improvement in technique on today's study.   Status       MR Brain W/WO IV Cont2/3/2015  [facility]  Result Impression   IMPRESSION:  No interval change when compared to examination of 08/15/2014.    Result Narrative       INDICATION: Headache frontal.      TECHNIQUE:  MRI of the head with and without contrast using tumor protocol, 10 mL GADOBUTROL 7.5 MMOL/7.5 ML (1 MMOL/ML) INTRAVENOUS SOLUTION.    COMPARISON: 08/15/2014.     FINDINGS:  Normal diffusion. Stable foci of T2 and FLAIR signal hyperintensity within the white matter bilaterally. Stable mild to moderate cerebral and mild cerebellar volume loss. Normal flow voids within the major intracranial vessels. Normal   enhancement. Stable mild inflammatory changes involving mastoid air cells.   Other Result Information  "  Interface, In  Radiology Results - 06/19/2016  5:49 AM CDT    INDICATION: Headache frontal.      TECHNIQUE:  MRI of the head with and without contrast using tumor protocol, 10 mL GADOBUTROL 7.5 MMOL/7.5 ML (1 MMOL/ML) INTRAVENOUS SOLUTION.    COMPARISON: 08/15/2014.     FINDINGS:  Normal diffusion. Stable foci of T2 and FLAIR signal hyperintensity within the white matter bilaterally. Stable mild to moderate cerebral and mild cerebellar volume loss. Normal flow voids within the major intracranial vessels. Normal   enhancement. Stable mild inflammatory changes involving mastoid air cells.    IMPRESSION  IMPRESSION:  No interval          Patient Demographics  - 69 y.o. Male; born [birth year]     Patient Address Communication Language Race / Ethnicity   [address] [place] [address] [place] [address] [phone] (Mobile)  [phone] (Home) English (Preferred) Unknown / Unknown   Source Comments  - [facility]    You are receiving this document as you are listed as the primary care provider,follow-up provider, or the patient has been referred to you for consultation.This is in compliance with the Medicare and Medicaid EHR Incentive Program,which states \"Providers who transition their patient to another setting of careor provider of care or refers their patient to another provider of care shouldprovide summary care record for each transition of care or referral.\"  Allergies  Reconcile with Patient's Chart    Active Allergy Reactions Severity Noted Date Comments   Sulfamethoxazole-Trimethoprim Nausea Medium 05/08/2012     Tolterodine Other, see comments Medium 05/08/2012 PN: nausea      Medications  Reconcile with Patient's Chart    Medication Sig Dispensed Refills Start Date End Date Status   buPROPion (WELLBUTRINXL) 150 MG 24 hour release tablet   Take 150 mg by mouth daily. Take with bupropion  mg for a total of 450 mg daily 90 tablet   0 01/23/2015   Active   IRON OR   Take 65 mg by mouth daily. " Reported on 5/8/2017   0 02/09/2015   Active   ascorbic acid (VITAMINC) 500 MG tablet   Take 500 mg by mouth two times a day. Reported on 4/10/2017   0 01/13/2016   Active   Multiple Vitamins-Minerals (MULTIVITAMIN ADULT OR)   Take 1 tablet by mouth daily (every 24 hours).   0 01/13/2016   Active   amphetamine-dextroamphetamine (ADDERALL) 20 MG tablet   TAKE ONE TABLET BY MOUTH TWICE DAILY  60 tablet   0 05/02/2016   Active       Additional information  Patient not taking. Reported on 9/6/2019 6:10 PM     B Complex Vitamins capsule   Take 1 Cap by mouth daily.   0     Active   rOPINIRole (REQUIP) 1 MG tablet   TAKE ONE TABLET BY MOUTH THREE TIMES DAILY  270 Tab   3 03/27/2018   Active   buPROPion (WELLBUTRIN XL) 300 MG 24 hour release tablet   Take 150 mg by mouth daily. Take with 150 mg for a total of 450 mg daily   0 03/27/2018   Active   lamoTRIgine (LAMICTAL) 150 MG tablet   Take 200 mg by mouth daily. Reduced to 200mg A week ago   0 01/25/2018   Active   fexofenadine (ALLEGRA) 180 MG tablet   TAKE ONE TABLET BY MOUTH DAILY AS NEEDED  90 Tab   2 06/02/2018   Active   furosemide (LASIX) 20 MG tablet   Take 0.5 Tabs by mouth daily. 45 Tab   3 06/18/2018   Active   phenazopyridine (PYRIDIUM) 100 MG tablet   TAKE ONE TABLET BY MOUTH THREE TIMES DAILY AS NEEDED FOR PAIN  50 Tablet   4 09/04/2018   Active   oxybutynin (DITROPANXL) 10 MG 24 hour release tablet   TAKE ONE TABLET BY MOUTH ONE TIME DAILY  60 Tablet   10 12/11/2018   Active   folic acid 1 MG tablet   TAKE ONE TABLET BY MOUTH ONE TIME DAILY  90 Tablet   2 12/29/2018   Active   ARIPiprazole (ABILIFY OR)       0     Active   atorvastatin (LIPITOR) 10 MG tablet   Take 1 Tablet by mouth daily. 90 Tablet   3 03/04/2019 03/03/2020 Active   amphetamine-dextroamphetamine (ADDERALL) 30 MG tablet   1 Tablet two times a day.   0 07/22/2019   Active   diclofenac (VOLTAREN) 1 % gel   Apply 4 g to skin 4 times daily as needed (Foot pain). 100 g   0 09/06/2019   Active    HYDROcodone-acetaminophen (NORCO) 5-325 MG tablet   Take 1 Tablet by mouth every 8 hours as needed for Pain. 10 Tablet   0 09/06/2019   Active   HYDROcodone-acetaminophen (NORCO) 5-325 MG tablet   Take 1 Tablet by mouth every 6 hours as needed for Pain. 10 Tablet   0 08/10/2019 09/06/2019 Discontinued (*Patient decision or formulary issue)     Hospital, Clinic, or Other Facility Administered Medication Ordered Dose Route Frequency Start Date End Date Status   cyanocobalamin ([id number]) injection 1,000 mcg    Indications: Vitamin B 12 deficiency 1000 mcg IM EVERY 30 DAYS 08/23/2017   Active   Active Problems  Reconcile with Patient's Chart    Problem Noted Date   Plantar fasciitis 04/04/2018   Pain in right foot 04/04/2018   Spondylosis of cervical region without myelopathy or radiculopathy 10/14/2017   Sinusitis 10/07/2016   Acute recurrent maxillary sinusitis 10/07/2016   Urinary frequency 02/27/2016   Alcoholism /alcohol abuse 12/21/2015   Delirium 11/26/2015   Peripheral neuropathy 11/26/2015   Panic disorder 09/12/2015   Seasonal allergic rhinitis 09/12/2015   Acute diarrhea 03/09/2015   Neck pain 03/09/2015   Gait disorder 01/12/2015   Vitamin B 12 deficiency 11/05/2013   Acute nasopharyngitis (common cold) 11/04/2013   Ataxia 08/16/2013   Bipolar disorder 08/13/2013   Overview:     Bipolar disorder, unspecified ([facility])     Knee pain 06/06/2013   Bladder spasm 06/06/2013   Postural hypotension 01/28/2013   Obstructive sleep apnea 10/15/2012   Overview:     Mild, AHI 6/hour, RDI 21/hr, Low Sa02 83%, sstudy 7-23-12  AutoPAP 5-15 cmH20, PNic  ; Obstructive sleep apnea (adult) (pediatric)     Restless leg syndrome 07/30/2012   Burning with urination 05/01/2012   Herniation of lumbar intervertebral disc with radiculopathy 11/10/2011   Spinal stenosis of lumbar region with neurogenic claudication 11/10/2011   Spondylolisthesis of lumbar region 11/10/2011   ADHD, predominantly inattentive type 08/24/2011   Tobacco  abuse 2011   Low back pain 2011   Severe bipolar I disorder, current or most recent episode depressed 2011   Overview:     Bipolar I disorder, most recent episode (or current) depressed, severe, without mention of psychotic behavior ([facility])     Resolved Problems      Problem Noted Date Resolved Date   URTI (acute upper respiratory infection) 2014   Bipolar 2 disorder 2011   Encounters  - from Last 3 Months    Date Type Specialty Care Team Description   2019 Hospital Encounter Urgent Care Lexis Paul DO   Plantar fasciitis   2019 Initial Consult Podiatry Timur Neville, DPM   Plantar fasciitis, left (Primary Dx)   08/10/2019 Ancillary Procedure Radiology PN       08/10/2019 Hospital Encounter Urgent Care Guillermina Saunders MD   Left foot pain   Immunizations  Reconcile with Patient's Chart    Name Administration Dates Next Due   Influenza IIV3 (Trivalent) Fluzone Highdose, 65+ Yrs (68961) 2017     PCV13 (Prevnar) 2017     Tdap 2013     Surgical History      Surgery Date Site/Laterality Comments   PROSTATE SURGERY          CERVICAL SPINE SURGERY          KNEE SURGERY     left     Medical History      Medical History Date Comments   Cancer ([facility])  prostate   Urinary frequency 2016     Brain injuries ([facility])      Family History      Medical History Relation Name Comments   Alzheimer's Birth Father       Cancer Birth Father   prostate   Anxiety Birth Mother       Depression Birth Mother       No Known Family HIstory Problems Daughter       No Known Family HIstory Problems Son         Relation Name Status Comments   Birth Father        Birth Mother        Daughter   Alive     Sister   Alive     Sister   Alive     Son   Alive     Social History      Tobacco Use Types Packs/Day Years Used Date   Current Every Day Smoker Cigarettes 0.33 45     Smokeless Tobacco: Never Used           Tobacco Cessation: Ready to Quit:  Yes; Counseling Given: Yes  Comments: Smoking History Packs/day:     Alcohol Use Drinks/Week std. drinks/Week Comments   No     none     Sex Assigned at Birth Date Recorded   Not on file       Job Start Date Occupation Industry   Not on file Not on file Not on file     Travel History Travel Start Travel End   No recent travel history available.             Formatting of this note might be different from the original.  Subjective:     Patient ID: Benson Renae is a 67 y.o. male.    Chief Complaint: ataxia, neuropathy  HPI  THis 68 yo pt is seen for unclear reasons as an urgent consult for an episode of confusion. He has been followed by Dr. Wooten in our group for these problems since about 2013, had EMG-documented sensory axonal neuropathy, felt likely related to nutritional aberrations related to daily alcohol use, with B12 deficiency as one identifiable and treatable cause. More recently, he has developed some mild cognitive impairment. He most recently saw Dr. Wooten a little over a year ago, and so is overdue for followup with him. He is on multiple CNS-active drugs for what is called bipolar disorder: Adderall, Abilify, Wellbutrin, clonazepam, lamictal, and Requip (for restless legs).     He says that he had an episode of confusion a week ago, which he relates to having not been eating and drinking well for a period of time, and had allergies or something and then he started eating and drinking again and was fine. In general, he says his appetite has been poor of late (although his weight is the same that it has been for at least the last several years). The sister that is with him today saw him the next day and thought that he was OK, had been drinking more water by then. He saw the PCP the next day, Dr. Munson, who told him to see neurology urgently and stopped a couple of his medications (Flomax and Ditropan), started him back on B12 injections, and asked him to see his psychiatrist to try to consolidate some  of his psych meds. He has bipolar disorder, has not had manic periods for a long time, but is more sluggish, generally tired.     His RLS is worse when he is sick. He grinds his teeth too. He lives in an apt alone with family setting up his meds. 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 He manages his own money and drives, has no trouble driving. He smokes 1 pack every 2-3 days. He does not drink currently for the last 6 months. He had a DUI 10 years ago and so only drinks at home. He has been working with his psychiatrist to reduce some of his medications, but did not tolerate a reduction in Wellbutrin.     Patient Active Problem List   Diagnosis     Severe bipolar I disorder, current or most recent episode depressed ([facility])     Low back pain ([facility])     Tobacco abuse ([facility])     ADHD, predominantly inattentive type ([facility])     Herniation of lumbar intervertebral disc with radiculopathy ([facility])     Spinal stenosis of lumbar region with neurogenic claudication     Spondylolisthesis of lumbar region     Burning with urination     Restless leg syndrome     Obstructive sleep apnea     Postural hypotension     Knee pain     Bladder spasm     Bipolar disorder ([facility])     Ataxia     Acute nasopharyngitis (common cold)     Vitamin B 12 deficiency ([facility])     Gait disorder     Acute diarrhea     Neck pain     Panic disorder ([facility])     Seasonal allergic rhinitis     Alcoholism /alcohol abuse ([facility])     Urinary frequency     Delirium     Peripheral neuropathy     Sinusitis     Acute recurrent maxillary sinusitis     Spondylosis of cervical region without myelopathy or radiculopathy ([facility])     Plantar fasciitis     Pain in right foot     Outpatient Medications Prior to Visit   Medication Sig Dispense Refill     amphetamine-dextroamphetamine (ADDERALL) 20 MG tablet TAKE ONE TABLET BY MOUTH TWICE DAILY 60 tablet 0     ARIPiprazole (ABILIFY) 10 MG tablet Take 1 Tab  by mouth daily. Reported on 3/8/2017 30 Tab 6     ascorbic acid (VITAMINC) 500 MG tablet Take 500 mg by mouth two times a day. Reported on 4/10/2017     B Complex Vitamins capsule Take 1 Cap by mouth daily.     buPROPion (WELLBUTRIN XL) 300 MG 24 hour release tablet Take 150 mg by mouth daily. Take with 150 mg for a total of 450 mg daily     buPROPion (WELLBUTRINXL) 150 MG 24 hour release tablet Take 150 mg by mouth daily. Take with bupropion  mg for a total of 450 mg daily 90 tablet 0     clonazePAM (KLONOPIN) 0.5 MG tablet Take 0.5 mg by mouth daily at bedtime.     fexofenadine (ALLEGRA) 180 MG tablet TAKE ONE TABLET BY MOUTH DAILY AS NEEDED 90 Tab 2     fluticasone (FLONASE) 50 MCG/ACT nasal solution Place 2 Sprays into both nostrils daily. (Patient not taking: Reported on 7/2/2018) 16 g 11     folic acid 1 MG tablet Take 1 Tab by mouth daily. 90 Tab 3     furosemide (LASIX) 20 MG tablet Take 0.5 Tabs by mouth daily. 45 Tab 3     Ibuprofen-Diphenhydramine HCl (ADVIL PM) 200-25 MG Take 2 Tabs by mouth daily at bedtime.     IRON OR Take 65 mg by mouth daily. Reported on 5/8/2017     lamoTRIgine (LAMICTAL) 150 MG tablet Take 300 mg by mouth daily.     Multiple Vitamins-Minerals (MULTIVITAMIN ADULT OR) Take 1 tablet by mouth daily (every 24 hours).     phenazopyridine (PYRIDIUM) 100 MG tablet Take 1 Tab by mouth three times a day as needed for Pain (bladder pain). 50 Tab 5     rOPINIRole (REQUIP) 1 MG tablet TAKE ONE TABLET BY MOUTH THREE TIMES DAILY (Patient taking differently: Taking 1 tablet by mouth at night) 270 Tab 3     Facility-Administered Medications Prior to Visit   Medication Dose Route Frequency Provider Last Rate Last Dose     cyanocobalamin ([id number]) injection 1,000 mcg 1,000 mcg Intramuscular Q30 Days Emerson Munson MD 1,000 mcg at 06/18/18 1703     Family History   Problem Relation Age of Onset     Alzheimer's Birth Father     Cancer Birth Father   prostate     Depression Birth Mother      Anxiety Birth Mother     No Known Family HIstory Problems Daughter     No Known Family HIstory Problems Son     Social History   Substance Use Topics     Smoking status: Current Every Day Smoker   Packs/day: 0.33   Years: 45.00   Types: Cigarettes     Smokeless tobacco: Never Used   Comment: Smoking History Packs/day:     Alcohol use No   Comment: none     Review of Systems    Objective:   Physical Exam  /70 (BP Location: Right Arm, BP Cuff Size: Adult Regular)  Pulse 84  Resp 12  The heart showed a regular rhythm without murmurs, rubs, or gallops.  There were no carotid bruits.  There was no peripheral edema.  The patient appeared, in general, clean, matter of fact, smells of tobacco  No tremor, normal finger to nose and hand movements  Gait is minimally wide-based, and he has trouble doing tandem gait; no balance troubles on routine gait  Reflexes are 2+ and symmetric throughout except absent ankle jerks  He provided most of his own history, knew the names of his medications, where he worked, what he does, etc.     Assessment:     ICD-10-CM   1. Ataxia R27.0   2. Restless leg syndrome G25.81   3. Confusion, hx of, without neuro findings Z86.59     He had what sounds like a confusional episode, somewhat vaguely described, but which seemed to respond to hydration and food, and which, by his telling, was related to having not eaten and drunk well for a couple of days, and maybe taking some kind of allergy medicine. He seems back to baseline now. I agree with Dr. Munson that it is an opportune time to consider reducing some of his many CNS-active meds and get him restarted on his B12. I don't have a strong sense of any need for further workup to look for any new or undiagnosed neurological problems. We did reiterate the importance of good nutrition, fluid intake, exercise, and rest. He can follow up in general with Dr. Wooten as needed, and is working with his psychiatrist on his mental health meds. The  ropinirole dose for RLS is just at bedtime, so should not be causing daytime drowsiness or other concerns, could be continued at the current dose indefinitely.   Plan:     See above. 25 mi tt, 15 min discussion    Copy: Dr. Curt Santo, [id number] [address] [facility], [address], [facility] [address]        Electronically signed by Sanjuana Kerr MD at 07/02/2018 10:38 AM CDT          MR Angio Neck W/WO IV Cont8/21/2012  [facility]  Result Impression   IMPRESSION:  1. No evidence of a significant stenosis involving the cervical   segments of either internal carotid artery.   2. Patent vertebral basilar system.   3. Poor visualization of the origins of the vertebral arteries and   proximal left subclavian artery felt to be secondary to a combination   of motion artifact and timing of the bolus.  Stenoses in these areas   could not be excluded.  If clinically indicated consider reattempt at   gadolinium MRA for better evaluation of the origins of the great   vessels.     4.  Intracranial stenoses as described above.   Result Narrative     MRA of the neck    TECHNIQUE: 2-D and 3-D images of the neck without contrast were done.     FINDINGS: The 2-D images are mildly to moderately degraded by motion   artifact.  The 3-D images are less degraded by motion artifact.    There is no evidence of a significant stenosis involving either of   the proximal internal carotid arteries.  The cervical segments of   both vertebral arteries appear patent within the neck.  The left   vertebral artery is dominant.     Contrast MRA    TECHNIQUE: Routine contrast MRA was done.  10 mL of Gadavist was   injected intravenously.     FINDINGS: The images are degraded by motion artifact.  This is most   pronounced at the level of the aortic arch.  There is no evidence of   a significant stenosis involving the cervical segments of either   internal carotid artery.  The cervical segments of both vertebral   arteries appear patent within the neck.  There  is absence of signal   seen at the origins of the vertebral arteries and proximal left   subclavian artery felt to be most likely secondary to timing of the   bolus.  Stenoses in these areas could not be excluded.  The common   carotid arteries appear patent.  Long segment mild stenosis versus   artifact related to the timing of the bolus is seen involving the   proximal common carotid artery on the left.  The innominate artery on   the right appears patent.     MRA of the head    TECHNIQUE: Routine MRA of the head without contrast was done.     FINDINGS: The images are mildly degraded by motion artifact.   Vertebral basilar system: The intracranial vertebral arteries and   basilar artery appear patent.  The left vertebral artery is dominant.   Internal carotid arteries: The superior cervical, petrous, cavernous   and supraclinoid segments of both internal carotid arteries appear   patent.  The carotid siphons appear dolichoectatic.  Mild to moderate   stenoses versus loss of signal related to motion artifact and skull   base artifact the knee involving the mid portions of the carotid   siphons.    Middle cerebral arteries: The middle cerebral arteries appear patent.    Mild to moderate stenoses versus motion artifact are seen involving   the distal branches of both middle cerebral arteries.    Anterior cerebral arteries: The anterior cerebral arteries appear   patent.  Mild to moderate stenoses versus motion artifact seen   involving the A2 segments of both anterior cerebral arteries.  Motion   artifact is favored.   Posterior cerebral arteries: There is loss of signal seen involving   the very distal branches of both posterior cerebral arteries which   may be is secondary to areas of stenosis and/or artifact.  This is at   the periphery of the study.     Other Result Information   Interface, In  Radiology Results - 06/18/2016  3:12 AM CDT    MRA of the neck    TECHNIQUE: 2-D and 3-D images of the neck without  contrast were done.     FINDINGS: The 2-D images are mildly to moderately degraded by motion   artifact.  The 3-D images are less degraded by motion artifact.    There is no evidence of a significant stenosis involving either of   the proximal internal carotid arteries.  The cervical segments of   both vertebral arteries appear patent within the neck.  The left   vertebral artery is dominant.     Contrast MRA    TECHNIQUE: Routine contrast MRA was done.  10 mL of Gadavist was   injected intravenously.     FINDINGS: The images are degraded by motion artifact.  This is most   pronounced at the level of the aortic arch.  There is no evidence of   a significant stenosis involving the cervical segments of either   internal carotid artery.  The cervical segments of both vertebral   arteries appear patent within the neck.  There is absence of signal   seen at the origins of the vertebral arteries and proximal left   subclavian artery felt to be most likely secondary to timing of the   bolus.  Stenoses in these areas could not be excluded.  The common   carotid arteries appear patent.  Long segment mild stenosis versus   artifact related to the timing of the bolus is seen involving the   proximal common carotid artery on the left.  The innominate artery on   the right appears patent.     MRA of the head    TECHNIQUE: Routine MRA of the head without contrast was done.     FINDINGS: The images are mildly degraded by motion artifact.   Vertebral basilar system: The intracranial vertebral arteries and   basilar artery appear patent.  The left vertebral artery is dominant.   Internal carotid arteries: The superior cervical, petrous, cavernous   and supraclinoid segments of both internal carotid arteries appear   patent.  The carotid siphons appear dolichoectatic.  Mild to moderate   stenoses versus loss of signal related to motion artifact and skull   base artifact the knee involving the mid portions of the carotid   siphons.     Middle cerebral arteries: The middle cerebral arteries appear patent.    Mild to moderate stenoses versus motion artifact are seen involving   the distal branches of both middle cerebral arteries.    Anterior cerebral arteries: The anterior cerebral arteries appear   patent.  Mild to moderate stenoses versus motion artifact seen   involving the A2 segments of both anterior cerebral arteries.  Motion   artifact is favored.   Posterior cerebral arteries: There is loss of signal seen involving   the very distal branches of both posterior cerebral arteries which   may be is secondary to areas of stenosis and/or artifact.  This is at   the periphery of the study.      IMPRESSION  IMPRESSION:  1. No evidence of a significant stenosis involving the cervical   segments of either internal carotid artery.   2. Patent vertebral basilar system.   3. Poor visualization of the origins of the vertebral arteries and   proximal left subclavian artery felt to be secondary to a combination   of motion artifact and timing of the bolus.  Stenoses in these areas   could not be excluded.  If clinically indicated consider reattempt at   gadolinium MRA for better evaluation of the origins of the great   vessels.     4.  Intracranial stenoses as described above.     MR Angio [place] WO IV Cont8/21/2012  [facility]  Result Impression   IMPRESSION:  1. No evidence of a significant stenosis involving the cervical   segments of either internal carotid artery.   2. Patent vertebral basilar system.   3. Poor visualization of the origins of the vertebral arteries and   proximal left subclavian artery felt to be secondary to a combination   of motion artifact and timing of the bolus.  Stenoses in these areas   could not be excluded.  If clinically indicated consider reattempt at   gadolinium MRA for better evaluation of the origins of the great   vessels.     4.  Intracranial stenoses as described above.   Result Narrative     MRA of  the neck    TECHNIQUE: 2-D and 3-D images of the neck without contrast were done.     FINDINGS: The 2-D images are mildly to moderately degraded by motion   artifact.  The 3-D images are less degraded by motion artifact.    There is no evidence of a significant stenosis involving either of   the proximal internal carotid arteries.  The cervical segments of   both vertebral arteries appear patent within the neck.  The left   vertebral artery is dominant.     Contrast MRA    TECHNIQUE: Routine contrast MRA was done.  10 mL of Gadavist was   injected intravenously.     FINDINGS: The images are degraded by motion artifact.  This is most   pronounced at the level of the aortic arch.  There is no evidence of   a significant stenosis involving the cervical segments of either   internal carotid artery.  The cervical segments of both vertebral   arteries appear patent within the neck.  There is absence of signal   seen at the origins of the vertebral arteries and proximal left   subclavian artery felt to be most likely secondary to timing of the   bolus.  Stenoses in these areas could not be excluded.  The common   carotid arteries appear patent.  Long segment mild stenosis versus   artifact related to the timing of the bolus is seen involving the   proximal common carotid artery on the left.  The innominate artery on   the right appears patent.     MRA of the head    TECHNIQUE: Routine MRA of the head without contrast was done.     FINDINGS: The images are mildly degraded by motion artifact.   Vertebral basilar system: The intracranial vertebral arteries and   basilar artery appear patent.  The left vertebral artery is dominant.   Internal carotid arteries: The superior cervical, petrous, cavernous   and supraclinoid segments of both internal carotid arteries appear   patent.  The carotid siphons appear dolichoectatic.  Mild to moderate   stenoses versus loss of signal related to motion artifact and skull   base artifact  the knee involving the mid portions of the carotid   siphons.    Middle cerebral arteries: The middle cerebral arteries appear patent.    Mild to moderate stenoses versus motion artifact are seen involving   the distal branches of both middle cerebral arteries.    Anterior cerebral arteries: The anterior cerebral arteries appear   patent.  Mild to moderate stenoses versus motion artifact seen   involving the A2 segments of both anterior cerebral arteries.  Motion   artifact is favored.   Posterior cerebral arteries: There is loss of signal seen involving   the very distal branches of both posterior cerebral arteries which   may be is secondary to areas of stenosis and/or artifact.  This is at   the periphery of the study.     Other Result Information   Interface, In  Radiology Results - 06/18/2016  3:12 AM CDT    MRA of the neck    TECHNIQUE: 2-D and 3-D images of the neck without contrast were done.     FINDINGS: The 2-D images are mildly to moderately degraded by motion   artifact.  The 3-D images are less degraded by motion artifact.    There is no evidence of a significant stenosis involving either of   the proximal internal carotid arteries.  The cervical segments of   both vertebral arteries appear patent within the neck.  The left   vertebral artery is dominant.     Contrast MRA    TECHNIQUE: Routine contrast MRA was done.  10 mL of Gadavist was   injected intravenously.     FINDINGS: The images are degraded by motion artifact.  This is most   pronounced at the level of the aortic arch.  There is no evidence of   a significant stenosis involving the cervical segments of either   internal carotid artery.  The cervical segments of both vertebral   arteries appear patent within the neck.  There is absence of signal   seen at the origins of the vertebral arteries and proximal left   subclavian artery felt to be most likely secondary to timing of the   bolus.  Stenoses in these areas could not be excluded.  The  common   carotid arteries appear patent.  Long segment mild stenosis versus   artifact related to the timing of the bolus is seen involving the   proximal common carotid artery on the left.  The innominate artery on   the right appears patent.     MRA of the head    TECHNIQUE: Routine MRA of the head without contrast was done.     FINDINGS: The images are mildly degraded by motion artifact.   Vertebral basilar system: The intracranial vertebral arteries and   basilar artery appear patent.  The left vertebral artery is dominant.   Internal carotid arteries: The superior cervical, petrous, cavernous   and supraclinoid segments of both internal carotid arteries appear   patent.  The carotid siphons appear dolichoectatic.  Mild to moderate   stenoses versus loss of signal related to motion artifact and skull   base artifact the knee involving the mid portions of the carotid   siphons.    Middle cerebral arteries: The middle cerebral arteries appear patent.    Mild to moderate stenoses versus motion artifact are seen involving   the distal branches of both middle cerebral arteries.    Anterior cerebral arteries: The anterior cerebral arteries appear   patent.  Mild to moderate stenoses versus motion artifact seen   involving the A2 segments of both anterior cerebral arteries.  Motion   artifact is favored.   Posterior cerebral arteries: There is loss of signal seen involving   the very distal branches of both posterior cerebral arteries which   may be is secondary to areas of stenosis and/or artifact.  This is at   the periphery of the study.      IMPRESSION  IMPRESSION:  1. No evidence of a significant stenosis involving the cervical   segments of either internal carotid artery.   2. Patent vertebral basilar system.   3. Poor visualization of the origins of the vertebral arteries and   proximal left subclavian artery felt to be secondary to a combination   of motion artifact and timing of the bolus.  Stenoses in these  areas   could not be excluded.  If clinically indicated consider reattempt at   gadolinium MRA for better evaluation of the origins of the great   vessels.     4.  Intracranial stenoses as     MR Brain W/WO IV Cont8/21/2012  [facility]  Result Impression   IMPRESSION:  1. Study degraded by motion artifact.   2. No definite evidence of an acute infarct.   3. Volume loss.   4. Small number of small nonspecific foci of increased signal   involving the brain parenchyma.  Small vessel ischemic disease is   favored.   5. Marked nonspecific decreased T1 signal seen involving the distal   odontoid questionable clinical significance.  This may be related to   sclerotic or degenerative changes.  Consider followup MRI of the   cervical spine.  Finding is of questionable clinical significance.   Result Narrative     TECHNIQUE: An MRI of the brain with and without contrast was done.    10 mL of Gadavist was injected intravenously.     FINDINGS: The images are mildly to moderately degraded by motion   artifact.  There is no definite evidence of an acute infarct or   cytotoxic edema on the diffusion images.  Mild to moderate volume   loss is seen.  Nonspecific marked decreased T1 signal is seen   involving the distal odontoid.  Probable small incidental arachnoid   cyst is seen within the superior vermian cistern.  Small number of   small nonspecific foci of increased signal are seen involving the   subcortical and deep white matter.  Mild mucosal membrane thickening   is seen within the paranasal sinuses.  A few opacified mastoid air   cells are seen.  Probable small mucus retention cysts are seen within   the nasopharynx.  The anterior clinoids are incidentally partially   pneumatized.  The post contrast images appear grossly unremarkable.    Other Result Information   Interface, In  Radiology Results - 06/18/2016  3:12 AM CDT    TECHNIQUE: An MRI of the brain with and without contrast was done.    10 mL of Gadavist was  injected intravenously.     FINDINGS: The images are mildly to moderately degraded by motion   artifact.  There is no definite evidence of an acute infarct or   cytotoxic edema on the diffusion images.  Mild to moderate volume   loss is seen.  Nonspecific marked decreased T1 signal is seen   involving the distal odontoid.  Probable small incidental arachnoid   cyst is seen within the superior vermian cistern.  Small number of   small nonspecific foci of increased signal are seen involving the   subcortical and deep white matter.  Mild mucosal membrane thickening   is seen within the paranasal sinuses.  A few opacified mastoid air   cells are seen.  Probable small mucus retention cysts are seen within   the nasopharynx.  The anterior clinoids are incidentally partially   pneumatized.  The post contrast images appear grossly unremarkable.     IMPRESSION  IMPRESSION:  1. Study degraded by motion artifact.   2. No definite evidence of an acute infarct.   3. Volume loss.   4. Small number of small nonspecific foci of increased signal   involving the brain parenchyma.  Small vessel ischemic disease is   favored.   5. Marked nonspecific decreased T1 signal seen involving the distal   odontoid questionable clinical significance.  This may be related to   sclerotic or degenerative changes.  Consider followup MRI of the   cervical spine.  Finding is of questionable clinical significance.   Status       Again, thank you for allowing me to participate in the care of your patient.      Sincerely,    Qasim Grajeda MD

## 2019-09-19 NOTE — NURSING NOTE
Chief Complaint   Patient presents with     Consult For     [facility] NEW - MOVEMENT PROBLEMS, NEUROPATHY       Depression Response    Patient completed the PHQ-9 assessment for depression and scored >9? Yes  Question 9 on the PHQ-9 was positive for suicidality? No  Is the patient already receiving treatment for depression? Yes  Patient would like to speak with behavioral health team ([facility] clinics only)? Yes    I personally notified the following: clinic nurse Elo Moreno and Dr. Grajeda    Behavioral Health/Social Work Contact Information     [facility]  David Estrella MA, LMFT  Lead Behavioral Health Clinician  Phone: [phone]  [place] [facility] Pager: [phone]    Non-[facility]  [facility] On-Call   Pager: [phone]       Preventive Care:    Colorectal Cancer Screening: During our visit today, we discussed that it is recommended you receive colorectal cancer screening. Please call or make an appointment with your primary care provider to discuss this. You may also call the  [facility] scheduling line ([phone]) to set up a colonoscopy appointment.      Daniel Ferguson, EMT

## 2019-09-23 ENCOUNTER — TELEPHONE (OUTPATIENT)
Dept: NEUROLOGY | Facility: CLINIC | Age: 69
End: 2019-09-23

## 2019-09-23 NOTE — TELEPHONE ENCOUNTER
MTM referral from: [facility] visit (referral by provider)    MTM referral outreach attempt #2 on September 23, 2019 at 3:11 PM      Outcome: Patient not reachable after several attempts, will route to MTM Pharmacist/Provider as an FYI. Thank you for the referral.    Clementina Cheng, MTM Coordinator

## 2019-09-24 LAB — INTERPRETATION ECG - MUSE: NORMAL

## 2019-10-03 ENCOUNTER — HEALTH MAINTENANCE LETTER (OUTPATIENT)
Age: 69
End: 2019-10-03

## 2019-12-16 ENCOUNTER — HEALTH MAINTENANCE LETTER (OUTPATIENT)
Age: 69
End: 2019-12-16

## 2021-01-15 ENCOUNTER — HEALTH MAINTENANCE LETTER (OUTPATIENT)
Age: 71
End: 2021-01-15

## 2021-04-26 ENCOUNTER — OFFICE VISIT - HEALTHEAST (OUTPATIENT)
Dept: ADDICTION MEDICINE | Facility: CLINIC | Age: 71
End: 2021-04-26

## 2021-04-26 DIAGNOSIS — F10.20 ALCOHOL USE DISORDER, SEVERE, DEPENDENCE (H): ICD-10-CM

## 2021-04-27 ENCOUNTER — COMMUNICATION - HEALTHEAST (OUTPATIENT)
Dept: ADDICTION MEDICINE | Facility: CLINIC | Age: 71
End: 2021-04-27

## 2021-05-13 ENCOUNTER — COMMUNICATION - HEALTHEAST (OUTPATIENT)
Dept: ADDICTION MEDICINE | Facility: CLINIC | Age: 71
End: 2021-05-13

## 2021-05-14 ENCOUNTER — COMMUNICATION - HEALTHEAST (OUTPATIENT)
Dept: ADDICTION MEDICINE | Facility: CLINIC | Age: 71
End: 2021-05-14

## 2021-07-20 NOTE — PROGRESS NOTES
"Individual Phone Session    Benson Renae is a 70 y.o. male who is being evaluated via telephone visit.      The patient has been notified of the following:      \"We have found that certain health care needs can be provided without the need for a face to face visit.  This service lets us provide the care you need with a phone conversation. I will have full access to your [facility] medical record during this entire phone call. I will be taking notes for your medical record. Since this is like an office visit, we will bill your insurance company for this service. There are potential benefits and risks of telephone visits (e.g. limits to patient confidentiality) that differ from in-person visits.?  Confidentiality still applies for telephone services, and nobody will record the visit.  It is important to be in a quiet, private space that is free of distractions (including cell phone or other devices) during the visit.?If during the course of the call I believe a telephone visit is not appropriate, you will not be charged for this service\"    Counselor and patient spoke via phone for 70 minutes to complete drug/alcohol evaluation.    Call Notes: Counselor contacted patient for continuity of care during suspension of in person services.     Patient was actively and directly involved in the assessment interview and treatment planning. I have reviewed the note as documented above.  This accurately captures the substance of my conversation with the patient.    Provider location: remote  Patient location: home  Mode of Transmission: phone    Call Started at: 10:30 AM  Call Ended at: 11:40 AM    Patient's engagement in the telephone visit: high     Supervising MD: Dr Sukh Paulino, MEd, [facility]  4/26/2021, 11:45 AM  "

## 2021-07-20 NOTE — PROGRESS NOTES
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  Substance use is continued despite knowledge of having a persistent or recurrent physical or psychological problem that is likely to have been caused or exacerbated by the substance.  Met for Alcohol     10. Tolerance, as defined by either of the following:  a. Markedly increased amounts of the substance in order to achieve intoxication or desired effect; b. Markedly diminished effect with continued use of the same amount.   Met for Alcohol  11. Withdrawal, as manifested by either of the following: a. The characteristic withdrawal syndrome for the substance; b. The same (or a closely related) substance is taken to relieve or avoid withdrawal symptoms. Met for Alcohol    Specify if: In early remission:  After full criteria for alcohol/drug use disorder were previously met, none of the criteria for alcohol/drug use disorder have been met for at least 3 months but for less than 12 months (with the exception that Criterion A4, Craving or a strong desire or urge to use alcohol/drug may be met).     In sustained remission:   After full criteria for alcohol use disorder were previously met, none of the criteria for alcohol/drug use disorder have been met at any time during a period of 12 months or longer (with  the exception that Criterion A4, Craving or strong desire or urge to use alcohol/drug may be met).   Specify if:   This additional specifier is used if the individual is in an environment where access to alcohol is restricted.    Mild: Presence of 2-3 symptoms  Moderate: Presence of 4-5 symptoms  Severe: Presence of 6 or more symptoms  Met for Alcohol    This document is being reviewed and co-signed by a medical doctor. A follow up appointment will be scheduled if necessary to determine medical necessity for treatment services.     Staff Name and Title: Rosanna Durham, [facility]  Date:  4/27/2021  Time:  1:21 PM

## 2021-07-20 NOTE — TELEPHONE ENCOUNTER
Faxed Pt's Drug/Alcohol assessment, JACK and face sheet to [facility] for referral to IP VIKKI treatment.

## 2021-07-20 NOTE — TELEPHONE ENCOUNTER
"12:09 PM Called Pt's son, left message requesting call back. (Pt's son called @9:48 AM today, left message regarding previous referral to [facility]. There is confusion-Pt's son thought this was a 30 day \"traditional\" residential CD program.)   5:28 PM Called Pt's son again, attempting to connect to discuss and clarify VIKKI treatment options.   "

## 2021-09-05 ENCOUNTER — HEALTH MAINTENANCE LETTER (OUTPATIENT)
Age: 71
End: 2021-09-05

## 2022-02-20 ENCOUNTER — HEALTH MAINTENANCE LETTER (OUTPATIENT)
Age: 72
End: 2022-02-20

## 2022-10-23 ENCOUNTER — HEALTH MAINTENANCE LETTER (OUTPATIENT)
Age: 72
End: 2022-10-23

## 2023-04-02 ENCOUNTER — HEALTH MAINTENANCE LETTER (OUTPATIENT)
Age: 73
End: 2023-04-02

## 2024-03-26 NOTE — TELEPHONE ENCOUNTER
5/14/21, 10:30 AM  Referral for VIKKI treatment faxed to [facility], [place],  VIKKI unit, fax [phone].   (VIKKI assessment 4/26/21; JACK; face sheet/insurance info.)  
Pt's son called, expressed his wishes for his dad to have 30 day IP treatment. He reports that he talked to the intake person at [facility] and that the program there doesn't meet what they were hoping for.  Pt's son added the patient to the phone call (3-way). Patient authorized me to send his VIKKI assessment and referral to [facility] in [place]; it appears that their program is a more traditional 30 day IP program.  
Attending Only

## 2025-06-28 DIAGNOSIS — F90.0 ADHD, PREDOMINANTLY INATTENTIVE TYPE: Primary | ICD-10-CM

## 2025-06-28 RX ORDER — DEXTROAMPHETAMINE SACCHARATE, AMPHETAMINE ASPARTATE, DEXTROAMPHETAMINE SULFATE AND AMPHETAMINE SULFATE 7.5; 7.5; 7.5; 7.5 MG/1; MG/1; MG/1; MG/1
30 TABLET ORAL 2 TIMES DAILY
Qty: 10 TABLET | Refills: 0 | Status: SHIPPED | OUTPATIENT
Start: 2025-06-28 | End: 2025-06-30

## 2025-06-30 ENCOUNTER — TRANSITIONAL CARE UNIT VISIT (OUTPATIENT)
Dept: GERIATRICS | Facility: CLINIC | Age: 75
End: 2025-06-30
Payer: COMMERCIAL

## 2025-06-30 VITALS
HEART RATE: 58 BPM | DIASTOLIC BLOOD PRESSURE: 78 MMHG | RESPIRATION RATE: 18 BRPM | SYSTOLIC BLOOD PRESSURE: 138 MMHG | TEMPERATURE: 97.5 F | OXYGEN SATURATION: 96 % | BODY MASS INDEX: 18.96 KG/M2 | HEIGHT: 72 IN | WEIGHT: 140 LBS

## 2025-06-30 DIAGNOSIS — R53.1 GENERALIZED WEAKNESS: ICD-10-CM

## 2025-06-30 DIAGNOSIS — R53.81 PHYSICAL DECONDITIONING: ICD-10-CM

## 2025-06-30 DIAGNOSIS — Z86.73 HISTORY OF CVA (CEREBROVASCULAR ACCIDENT): Primary | ICD-10-CM

## 2025-06-30 DIAGNOSIS — F41.1 GAD (GENERALIZED ANXIETY DISORDER): ICD-10-CM

## 2025-06-30 DIAGNOSIS — F10.20 UNCOMPLICATED ALCOHOL DEPENDENCE (H): ICD-10-CM

## 2025-06-30 DIAGNOSIS — B00.9 HERPES SIMPLEX VIRUS INFECTION: ICD-10-CM

## 2025-06-30 DIAGNOSIS — D64.9 ACUTE ON CHRONIC ANEMIA: ICD-10-CM

## 2025-06-30 DIAGNOSIS — J44.9 CHRONIC OBSTRUCTIVE PULMONARY DISEASE, UNSPECIFIED COPD TYPE (H): ICD-10-CM

## 2025-06-30 DIAGNOSIS — Z72.0 TOBACCO ABUSE: ICD-10-CM

## 2025-06-30 DIAGNOSIS — F90.0 ADHD, PREDOMINANTLY INATTENTIVE TYPE: ICD-10-CM

## 2025-06-30 DIAGNOSIS — I10 BENIGN ESSENTIAL HYPERTENSION: ICD-10-CM

## 2025-06-30 DIAGNOSIS — G62.9 PERIPHERAL POLYNEUROPATHY: ICD-10-CM

## 2025-06-30 DIAGNOSIS — R26.9 ABNORMAL GAIT: ICD-10-CM

## 2025-06-30 DIAGNOSIS — E43 SEVERE PROTEIN-CALORIE MALNUTRITION: ICD-10-CM

## 2025-06-30 DIAGNOSIS — G47.00 INSOMNIA, UNSPECIFIED TYPE: ICD-10-CM

## 2025-06-30 DIAGNOSIS — F33.9 RECURRENT MAJOR DEPRESSIVE DISORDER, REMISSION STATUS UNSPECIFIED: ICD-10-CM

## 2025-06-30 DIAGNOSIS — C83.00 SMALL LYMPHOCYTIC LYMPHOMA (H): ICD-10-CM

## 2025-06-30 DIAGNOSIS — G24.01 TARDIVE DYSKINESIA: ICD-10-CM

## 2025-06-30 DIAGNOSIS — F31.31 BIPOLAR AFFECTIVE DISORDER, CURRENTLY DEPRESSED, MILD (H): ICD-10-CM

## 2025-06-30 DIAGNOSIS — F41.0 PANIC DISORDER: ICD-10-CM

## 2025-06-30 DIAGNOSIS — F31.4 SEVERE BIPOLAR I DISORDER, CURRENT OR MOST RECENT EPISODE DEPRESSED (H): ICD-10-CM

## 2025-06-30 RX ORDER — NICOTINE 21 MG/24HR
1 PATCH, TRANSDERMAL 24 HOURS TRANSDERMAL EVERY 24 HOURS
COMMUNITY
Start: 2025-06-27

## 2025-06-30 RX ORDER — ASCORBIC ACID
500 CRYSTALS ORAL PRN
COMMUNITY

## 2025-06-30 RX ORDER — NALTREXONE HYDROCHLORIDE 50 MG/1
1 TABLET, FILM COATED ORAL DAILY
COMMUNITY
Start: 2025-06-16

## 2025-06-30 RX ORDER — DEXTROAMPHETAMINE SACCHARATE, AMPHETAMINE ASPARTATE, DEXTROAMPHETAMINE SULFATE AND AMPHETAMINE SULFATE 5; 5; 5; 5 MG/1; MG/1; MG/1; MG/1
20 TABLET ORAL DAILY PRN
Qty: 5 TABLET | Refills: 0 | Status: SHIPPED | OUTPATIENT
Start: 2025-06-30

## 2025-06-30 RX ORDER — CLOPIDOGREL BISULFATE 75 MG/1
75 TABLET ORAL DAILY
COMMUNITY
Start: 2025-06-27 | End: 2025-07-13

## 2025-06-30 RX ORDER — GABAPENTIN 300 MG/1
300 CAPSULE ORAL AT BEDTIME
COMMUNITY
Start: 2024-11-14

## 2025-06-30 RX ORDER — LOSARTAN POTASSIUM 25 MG/1
25 TABLET ORAL DAILY
COMMUNITY
Start: 2024-06-20

## 2025-06-30 RX ORDER — ACETAMINOPHEN 500 MG
1000 TABLET ORAL EVERY 6 HOURS PRN
COMMUNITY
Start: 2024-07-29

## 2025-06-30 RX ORDER — LORAZEPAM 0.5 MG/1
0.5 TABLET ORAL
COMMUNITY
Start: 2024-08-19

## 2025-06-30 RX ORDER — ZIPRASIDONE HYDROCHLORIDE 40 MG/1
40 CAPSULE ORAL AT BEDTIME
COMMUNITY
Start: 2025-06-16 | End: 2025-07-01

## 2025-06-30 RX ORDER — ALBUTEROL SULFATE 90 UG/1
1 INHALANT RESPIRATORY (INHALATION) EVERY 6 HOURS PRN
COMMUNITY
Start: 2025-05-01

## 2025-06-30 RX ORDER — PANTOPRAZOLE SODIUM 40 MG/1
40 TABLET, DELAYED RELEASE ORAL DAILY
COMMUNITY
Start: 2025-03-18

## 2025-06-30 RX ORDER — VENLAFAXINE HYDROCHLORIDE 75 MG/1
150 CAPSULE, EXTENDED RELEASE ORAL DAILY
COMMUNITY
Start: 2025-01-21 | End: 2025-06-30

## 2025-06-30 RX ORDER — VENLAFAXINE HYDROCHLORIDE 75 MG/1
225 CAPSULE, EXTENDED RELEASE ORAL DAILY
Status: SHIPPED
Start: 2025-06-30

## 2025-06-30 RX ORDER — ASPIRIN 81 MG/1
81 TABLET, CHEWABLE ORAL DAILY
COMMUNITY
Start: 2025-06-27

## 2025-06-30 RX ORDER — DEXTROAMPHETAMINE SACCHARATE, AMPHETAMINE ASPARTATE, DEXTROAMPHETAMINE SULFATE AND AMPHETAMINE SULFATE 5; 5; 5; 5 MG/1; MG/1; MG/1; MG/1
20 TABLET ORAL 2 TIMES DAILY
COMMUNITY
Start: 2025-06-16 | End: 2025-06-30

## 2025-06-30 RX ORDER — VALACYCLOVIR HYDROCHLORIDE 1 G/1
1000 TABLET, FILM COATED ORAL 2 TIMES DAILY
COMMUNITY
Start: 2025-06-27 | End: 2025-07-03

## 2025-06-30 RX ORDER — ATORVASTATIN CALCIUM 10 MG/1
10 TABLET, FILM COATED ORAL AT BEDTIME
COMMUNITY
End: 2025-07-01

## 2025-06-30 NOTE — PROGRESS NOTES
[facility] GERIATRICS    PRIMARY CARE PROVIDER AND CLINIC:  Hever Senior MD, [facility] [address] [facility] / [facility]  Chief Complaint   Patient presents with    Hospital F/U      [facility] Medical Record Number:  [id number]  Place of Service where encounter took place:  [facility] (TCU) [[id number]]    Benson Renae  is a 74 year old  (1950), admitted to the above facility from  [facility] . Hospital stay 6/21/25 through 6/27/25..       His past medical history includes  ischemic stroke  Uncomplicated alcohol dependence  Major depressive disorder,   Generalized anxiety disorder  bipolar disorder   Tardive dyskinesia  Insomnia  - Tobacco abuse  - ADHD, predominantly inattentive type  - Peripheral polyneuropathy  - Essential hypertension  - Small lymphocytic lymphoma  - Severe protein-calorie malnutrition  - Acute on chronic anemia    On 6/21, he presented with a 24 hour hx of nausea and vomiting and dizziness and unable to stand and ambulate without assistance with tingling in the BLE L>R, but normally this is only on the left side. .  Started Geodon the week prior.   He was found to have a acute ischemic infarction in the right cerebellum     Today he was feeling anxious, nauseous, ache but walking around the room without a walker  Very thin - ate less than 25% of lunch      CODE STATUS/ADVANCE DIRECTIVES DISCUSSION:  No Order  CPR/Full code   ALLERGIES:   Allergies   Allergen Reactions    Sulfamethoxazole-Trimethoprim Nausea    Tolterodine Other (See Comments)     PN: nausea       PAST MEDICAL HISTORY:   Past Medical History:   Diagnosis Date    H/O neck surgery 9/19/2019    intermittent hoarseness in a smoker 9/19/2019    Intermittent hoarseness    Tardive dyskinesia 9/19/2019    Wears glasses 9/19/2019      PAST SURGICAL HISTORY:   has a past surgical history that includes Prostate surgery; Neck surgery; knee surgery (Left); tonsillectomy;  ------------other------------- (Left); and IR Lumbar Puncture (8/9/2024).  FAMILY HISTORY: family history includes Alzheimer Disease in his father; Anxiety Disorder in his mother; Depression in his mother; Other - See Comments in his daughter, sister, sister, and son.  SOCIAL HISTORY:   reports that he has been smoking. He has never used smokeless tobacco. He reports that he does not currently use alcohol.  Patient's living condition: lives alone    Post Discharge Medication Reconciliation Status:   MED REC REQUIRED  Post Medication Reconciliation Status:  Discharge medications reconciled and changed, see notes/orders       Current Outpatient Medications   Medication Sig Dispense Refill    acetaminophen (TYLENOL) 500 MG tablet Take 1,000 mg by mouth every 6 hours as needed for mild pain.      albuterol (PROAIR HFA/PROVENTIL HFA/VENTOLIN HFA) 108 (90 Base) MCG/ACT inhaler Inhale 1 puff into the lungs every 6 hours as needed for shortness of breath.      ascorbic acid 500 MG TABS Take 500 mg by mouth daily       aspirin (ASA) 81 MG chewable tablet Take 81 mg by mouth daily.      atorvastatin (LIPITOR) 10 MG tablet Take 10 mg by mouth at bedtime.      calcium carbonate antacid 1000 MG CHEW Take 1 tablet by mouth as needed for heartburn.      calcium pantothenate 500 MG TABS Take 500 mg by mouth as needed (heartburn).      clopidogrel (PLAVIX) 75 MG tablet Take 75 mg by mouth daily.      folic acid (FOLVITE) 1 MG tablet TAKE ONE TABLET BY MOUTH ONE TIME DAILY      gabapentin (NEURONTIN) 300 MG capsule Take 300 mg by mouth at bedtime.      LORazepam (ATIVAN) 0.5 MG tablet Take 0.5 mg by mouth nightly as needed for sleep.      losartan (COZAAR) 25 MG tablet Take 25 mg by mouth daily.      melatonin 3 MG tablet Take 3 mg by mouth nightly as needed for sleep.      naltrexone (DEPADE/REVIA) 50 MG tablet Take 1 tablet by mouth daily.      nicotine (NICODERM CQ) 14 MG/24HR 24 hr patch Place 1 patch onto the skin every 24  hours.      pantoprazole (PROTONIX) 40 MG EC tablet Take 40 mg by mouth daily.      valACYclovir (VALTREX) 1000 mg tablet Take 1,000 mg by mouth 2 times daily.      Valbenazine Tosylate 80 MG CAPS Take 1 capsule by mouth daily.      venlafaxine (EFFEXOR XR) 75 MG 24 hr capsule Take 3 capsules (225 mg) by mouth daily. Effexor +75 mg      amphetamine-dextroamphetamine (ADDERALL) 20 MG tablet Take 20 mg by mouth 2 times daily.      amphetamine-dextroamphetamine (ADDERALL) 30 MG tablet Take 1 tablet (30 mg) by mouth 2 times daily. 10 tablet 0    ziprasidone (GEODON) 40 MG capsule Take 40 mg by mouth at bedtime. (Patient not taking: Reported on 6/30/2025)       No current facility-administered medications for this visit.       ROS:  4 point ROS including Respiratory, CV, GI and , other than that noted in the HPI,  is negative    Vitals:  /78   Pulse 58   Temp 97.5  F (36.4  C)   Resp 18   Ht 1.829 m (6')   Wt 63.5 kg (140 lb)   SpO2 96%   BMI 18.99 kg/m    Exam:  GENERAL APPEARANCE:  Alert, in no distress  RESP:  lungs clear to auscultation , no respiratory distress  CV:  rate-normal  PSYCH:  anxious    Lab/Diagnostic data:  Recent labs in [facility] reviewed by me today.     ASSESSMENT/PLAN:    (Z86.73) History of CVA (cerebrovascular accident)  (primary encounter diagnosis)  Comment: with vertigo, gait instability and found to have small cerebellar stroke on MRI. Likely cardioembolic.   Plan:   DAPT x3 weeks then ASA indefinitely   lipitor 10 >> 40 mg with goal LDL < 70   ziopatch on discharge   smoking cessation   Therapy to evaluate and treat  Monitor BMP, CBC, lipids    (E43) Severe protein-calorie malnutrition  (R53.81) Physical deconditioning  (R53.1) Generalized weakness  (R26.9) Abnormal gait  Comment: chronic   Uses a walker for distance at [facility]  Plan: therapy to evaluate and treat    (F10.20) Uncomplicated alcohol dependence (H)  Comment: chronic  Takes Naltrexone.   Plan: Continue with plan of  care no changes at this time, adjustment as needed      (F33.9) Recurrent major depressive disorder, remission status unspecified  (F41.1) NIA (generalized anxiety disorder)  (F31.31) Bipolar affective disorder, currently depressed, mild (H)  (F90.0) ADHD, predominantly inattentive type  (F41.0) Panic disorder  (F31.4) Severe bipolar I disorder, current or most recent episode depressed (H)  Comment: chronic  Takes adderall - but not regularly and doesn't think he needs it at the TCU   Lorazepam 0.5 mg at HS   Effexor 225 mg daily   Geodone on hold due to bradycardia and lethargy - pt doesn't have lethargy but is bradycardic   Plan: follow with psychiatry     (G24.01) Tardive dyskinesia  Comment: chronic   Takes valbenazine 80 mg daily   Plan: Continue with plan of care no changes at this time, adjustment as needed      (G47.00) Insomnia, unspecified type  Comment: chronic  Melatonin and ativan   Was geodon - held due to lethargy and bradycardia (still bradycardic but anxious)   Plan: Continue with plan of care no changes at this time, adjustment as needed    [J44.9] Chronic obstructive pulmonary disease, unspecified COPD type (H)  (Z72.0) Tobacco abuse  Comment:  chronic  Has a nicotene patch   Plan: Continue with plan of care no changes at this time, adjustment as needed      (G62.9) Peripheral polyneuropathy  Comment: chronic  Takes gabapentin  Plan: Continue with plan of care no changes at this time, adjustment as needed      (I10) Benign essential hypertension  Comment: chronic  Controlled  Taking losartan  Plan: monitor BMP, CBC and BP     (C83.00) Small lymphocytic lymphoma (H)  Comment: Managed outpatient with the primary care provider.   Plan: follow up with primary care         (D64.9) Acute on chronic anemia  Comment: acute on chronic  Hgb 10 (6/24)   Plan: monitor BMP, CBC        [B00.9]Herpes simplex virus infection   Comment: per hospital note - Cluster or blisters with erythematus base on buttock close  to anus area   positive for HSV   valacyclovir 1000 bid x 7-10 days.   Plan: Continue with plan of care no changes at this time, adjustment as needed        Electronically signed by:  LOTUS Wagner CNP on 6/30/2025 at 7:21 PM    45 minutes was spent reviewing medical record from [facility], assessing patient, reviewing medical notes from previous primary care provider, talking with pt and answering their questions/concerns, coordinating above plan of care with nursing , SW, therapy and dietitian and patient and reviewed medications with patient and counseled pt. on above plan of care.  Counseled on medications and side effects.

## 2025-06-30 NOTE — PROGRESS NOTES
[facility] Geriatrics   2025     Name: Benson Renae   : 1950     Background:  Pt has ADHD but hasn't been taking adderall as he hasn't been working     Orders:  Stop all AmphetamineDextroamphetamine Oral (AmphetamineDextroamphetamine) orders  Start AmphetamineDextroamphetamine Oral (Adderall) 20 mg by mouth daily as needed for concentration.   Dx ADHD     Electronically signed by     LOTUS Wagner CNP on 2025 at 4:05 PM

## 2025-07-01 ENCOUNTER — TRANSITIONAL CARE UNIT VISIT (OUTPATIENT)
Dept: GERIATRICS | Facility: CLINIC | Age: 75
End: 2025-07-01
Payer: COMMERCIAL

## 2025-07-01 VITALS
RESPIRATION RATE: 18 BRPM | TEMPERATURE: 97.5 F | HEIGHT: 72 IN | BODY MASS INDEX: 18.96 KG/M2 | DIASTOLIC BLOOD PRESSURE: 68 MMHG | WEIGHT: 140 LBS | SYSTOLIC BLOOD PRESSURE: 117 MMHG | OXYGEN SATURATION: 89 % | HEART RATE: 57 BPM

## 2025-07-01 DIAGNOSIS — J44.9 CHRONIC OBSTRUCTIVE PULMONARY DISEASE, UNSPECIFIED COPD TYPE (H): ICD-10-CM

## 2025-07-01 DIAGNOSIS — R11.0 NAUSEA: Primary | ICD-10-CM

## 2025-07-01 DIAGNOSIS — D64.9 ACUTE ON CHRONIC ANEMIA: ICD-10-CM

## 2025-07-01 DIAGNOSIS — E43 SEVERE PROTEIN-CALORIE MALNUTRITION: ICD-10-CM

## 2025-07-01 DIAGNOSIS — G47.00 INSOMNIA, UNSPECIFIED TYPE: ICD-10-CM

## 2025-07-01 DIAGNOSIS — R53.81 PHYSICAL DECONDITIONING: ICD-10-CM

## 2025-07-01 DIAGNOSIS — F10.20 UNCOMPLICATED ALCOHOL DEPENDENCE (H): ICD-10-CM

## 2025-07-01 DIAGNOSIS — K59.00 CONSTIPATION, UNSPECIFIED CONSTIPATION TYPE: ICD-10-CM

## 2025-07-01 DIAGNOSIS — C83.00 SMALL LYMPHOCYTIC LYMPHOMA (H): ICD-10-CM

## 2025-07-01 DIAGNOSIS — Z86.73 HISTORY OF CVA (CEREBROVASCULAR ACCIDENT): ICD-10-CM

## 2025-07-01 DIAGNOSIS — R26.9 ABNORMAL GAIT: ICD-10-CM

## 2025-07-01 DIAGNOSIS — G24.01 TARDIVE DYSKINESIA: ICD-10-CM

## 2025-07-01 DIAGNOSIS — F33.9 RECURRENT MAJOR DEPRESSIVE DISORDER, REMISSION STATUS UNSPECIFIED: ICD-10-CM

## 2025-07-01 DIAGNOSIS — F41.1 GAD (GENERALIZED ANXIETY DISORDER): ICD-10-CM

## 2025-07-01 DIAGNOSIS — Z72.0 TOBACCO ABUSE: ICD-10-CM

## 2025-07-01 DIAGNOSIS — I10 BENIGN ESSENTIAL HYPERTENSION: ICD-10-CM

## 2025-07-01 DIAGNOSIS — F90.0 ADHD, PREDOMINANTLY INATTENTIVE TYPE: ICD-10-CM

## 2025-07-01 DIAGNOSIS — B00.9 HERPES SIMPLEX VIRUS INFECTION: ICD-10-CM

## 2025-07-01 DIAGNOSIS — R53.1 GENERALIZED WEAKNESS: ICD-10-CM

## 2025-07-01 DIAGNOSIS — F31.31 BIPOLAR AFFECTIVE DISORDER, CURRENTLY DEPRESSED, MILD (H): ICD-10-CM

## 2025-07-01 DIAGNOSIS — F31.4 SEVERE BIPOLAR I DISORDER, CURRENT OR MOST RECENT EPISODE DEPRESSED (H): ICD-10-CM

## 2025-07-01 DIAGNOSIS — F41.0 PANIC DISORDER: ICD-10-CM

## 2025-07-01 DIAGNOSIS — G62.9 PERIPHERAL POLYNEUROPATHY: ICD-10-CM

## 2025-07-01 RX ORDER — ATORVASTATIN CALCIUM 40 MG/1
40 TABLET, FILM COATED ORAL DAILY
Status: SHIPPED
Start: 2025-07-01

## 2025-07-01 RX ORDER — ONDANSETRON 4 MG/1
4 TABLET, ORALLY DISINTEGRATING ORAL EVERY 8 HOURS PRN
Status: SHIPPED
Start: 2025-07-01

## 2025-07-01 RX ORDER — SENNA AND DOCUSATE SODIUM 50; 8.6 MG/1; MG/1
1 TABLET, FILM COATED ORAL AT BEDTIME
Status: SHIPPED
Start: 2025-07-01

## 2025-07-01 RX ORDER — POLYETHYLENE GLYCOL 3350 17 G/17G
17 POWDER, FOR SOLUTION ORAL DAILY PRN
Status: SHIPPED
Start: 2025-07-01

## 2025-07-01 NOTE — PROGRESS NOTES
[facility] GERIATRICS    Chief Complaint   Patient presents with    RECHECK     Heart Rate, Mood     HPI:  Benson Renae is a 74 year old  (1950), who is being seen today for an episodic care visit at: [facility] [[id number]].     He is more lethargic  Needed convincing to sit on the side of the bed.       Allergies, and PMH/PSH reviewed in EPIC today.  REVIEW OF SYSTEMS:  4 point ROS including Respiratory, CV, GI and , other than that noted in the HPI,  is negative    Objective:   /68   Pulse 57   Temp 97.5  F (36.4  C)   Resp 18   Ht 1.829 m (6')   Wt 63.5 kg (140 lb)   SpO2 (!) 89%   BMI 18.99 kg/m    GENERAL APPEARANCE:  Alert, thin  RESP:  no respiratory distress  PSYCH:  affect abnormal flat    Recent labs in [facility] reviewed by me today.     Assessment/Plan:  (R11.0) Nausea  (primary encounter diagnosis)  (K59.00) Constipation, unspecified constipation type  Comment: pt complains of chronic nausea.   Unsure how long he has had symptom.  Spoke with ex-wife who confirms this is an ongoing problem.    Pt doesn't remember when he had his last BM.   Plan:   Zofran 4 mg every 8 hours a needed for nausea.   SENNA-docusate sodium (SENNA S) 8.6-50 MG         tablet, polyethylene glycol (MIRALAX) 17         GM/Dose powder      (Z86.73) History of CVA (cerebrovascular accident)  (primary encounter diagnosis)  Comment: with vertigo, gait instability and found to have small cerebellar stroke on MRI. Likely cardioembolic.   Plan:   DAPT x3 weeks then ASA indefinitely   lipitor 10 >> 40 mg with goal LDL < 70   ziopatch on discharge   smoking cessation   Working with therapy   Increase lipitor to 40 mg daily      (E43) Severe protein-calorie malnutrition  (R53.81) Physical deconditioning  (R53.1) Generalized weakness  (R26.9) Abnormal gait  Comment: chronic   Uses a walker for distance at [facility]  Plan: working with therapy      (F10.20) Uncomplicated alcohol dependence (H)  Comment:  chronic  Takes Naltrexone.   Plan: Continue with plan of care no changes at this time, adjustment as needed        (F33.9) Recurrent major depressive disorder, remission status unspecified  (F41.1) NIA (generalized anxiety disorder)  (F31.31) Bipolar affective disorder, currently depressed, mild (H)  (F90.0) ADHD, predominantly inattentive type  (F41.0) Panic disorder  (F31.4) Severe bipolar I disorder, current or most recent episode depressed (H)  Comment: chronic  Takes adderall - but not regularly and doesn't think he needs it at the TCU   Lorazepam 0.5 mg at HS   Effexor 225 mg daily   Geodone on hold due to bradycardia and lethargy - pt doesn't have lethargy but is bradycardic   Plan: follow with psychiatry      (G24.01) Tardive dyskinesia  Comment: chronic   Takes valbenazine 80 mg daily   Plan: Continue with plan of care no changes at this time, adjustment as needed        (G47.00) Insomnia, unspecified type  Comment: chronic  Melatonin and ativan   Was geodon - held due to lethargy and bradycardia (still bradycardic but anxious)   Plan: Continue with plan of care no changes at this time, adjustment as needed     [J44.9] Chronic obstructive pulmonary disease, unspecified COPD type (H)  (Z72.0) Tobacco abuse  Comment:  chronic  Has a nicotene patch   Plan: Continue with plan of care no changes at this time, adjustment as needed        (G62.9) Peripheral polyneuropathy  Comment: chronic  Takes gabapentin  Plan: Continue with plan of care no changes at this time, adjustment as needed        (I10) Benign essential hypertension  Comment: chronic  Controlled  Taking losartan  Plan: monitor BMP, CBC and BP      (C83.00) Small lymphocytic lymphoma (H)  Comment: Managed outpatient with the primary care provider.   Plan: follow up with primary care            (D64.9) Acute on chronic anemia  Comment: acute on chronic  Hgb 10 (6/24)   Plan: monitor BMP, CBC          [B00.9]Herpes simplex virus infection   Comment: per  hospital note - Cluster or blisters with erythematus base on buttock close to anus area   positive for HSV   valacyclovir 1000 bid x 7-10 days.   Plan: Continue with plan of care no changes at this time, adjustment as needed    MED REC REQUIRED  Post Medication Reconciliation Status:  Medication reconciliation previously completed during another office visit      Electronically signed by:       LOTUS Wagner CNP on 7/1/2025 at 6:44 PM      45 minutes was spent assessing patient, talking with pt and ex wife and answering their questions/concerns, coordinating above plan of care with nursing , SW, therapy and dietitian and patient and reviewed medications with patient and counseled pt. on above plan of care.  Counseled on medications and side effects.

## 2025-07-01 NOTE — PROGRESS NOTES
[facility] Geriatrics   2025     Name: Benson Renae   : 1950     Background:  Pt had CVA     Orders:  Stop atorvastatin 10 mg daily   Start atorvastatin (Lipitor) 40 mg daily by mouth.   Dx CVA  Repeat cholesterol labs in 2025.   Dx medication monitoring   Please note in point click care pt has appointment with psychiatry - Dr Zhou on 2025    Electronically signed by     LOTUS Wagner CNP on 2025 at 6:46 PM

## 2025-07-07 NOTE — PROGRESS NOTES
[facility] GERIATRICS        Visit Type: RECHECK     Facility:   [facility] (TCU) [[id number]]         History of Present Illness: Benson Renae is a 74 year old male     Today Benson Renae feels ***  VS are ***  Weight is ***  Per nursing ***  Questions were answered    Current Outpatient Medications   Medication Sig Dispense Refill    acetaminophen (TYLENOL) 500 MG tablet Take 1,000 mg by mouth every 6 hours as needed for mild pain.      albuterol (PROAIR HFA/PROVENTIL HFA/VENTOLIN HFA) 108 (90 Base) MCG/ACT inhaler Inhale 1 puff into the lungs every 6 hours as needed for shortness of breath.      amphetamine-dextroamphetamine (ADDERALL) 20 MG tablet Take 1 tablet (20 mg) by mouth daily as needed (when he needs to focus). 5 tablet 0    ascorbic acid 500 MG TABS Take 500 mg by mouth daily       aspirin (ASA) 81 MG chewable tablet Take 81 mg by mouth daily.      atorvastatin (LIPITOR) 40 MG tablet Take 1 tablet (40 mg) by mouth daily.      calcium carbonate antacid 1000 MG CHEW Take 1 tablet by mouth as needed for heartburn.      calcium pantothenate 500 MG TABS Take 500 mg by mouth as needed (heartburn).      clopidogrel (PLAVIX) 75 MG tablet Take 75 mg by mouth daily.      folic acid (FOLVITE) 1 MG tablet TAKE ONE TABLET BY MOUTH ONE TIME DAILY      gabapentin (NEURONTIN) 300 MG capsule Take 300 mg by mouth at bedtime.      LORazepam (ATIVAN) 0.5 MG tablet Take 0.5 mg by mouth nightly as needed for sleep.      losartan (COZAAR) 25 MG tablet Take 25 mg by mouth daily.      melatonin 3 MG tablet Take 3 mg by mouth nightly as needed for sleep.      naltrexone (DEPADE/REVIA) 50 MG tablet Take 1 tablet by mouth daily.      nicotine (NICODERM CQ) 14 MG/24HR 24 hr patch Place 1 patch onto the skin every 24 hours.      ondansetron (ZOFRAN ODT) 4 MG ODT tab Take 1 tablet (4 mg) by mouth every 8 hours as needed for nausea.      pantoprazole (PROTONIX) 40 MG EC tablet Take 40 mg by mouth daily.       polyethylene glycol (MIRALAX) 17 GM/Dose powder Take 17 g by mouth daily as needed for constipation.      SENNA-docusate sodium (SENNA S) 8.6-50 MG tablet Take 1 tablet by mouth at bedtime.      Valbenazine Tosylate 80 MG CAPS Take 1 capsule by mouth daily.      venlafaxine (EFFEXOR XR) 75 MG 24 hr capsule Take 3 capsules (225 mg) by mouth daily. Effexor +75 mg         ALLERGIES:Sulfamethoxazole-trimethoprim and Tolterodine    Vitals:  /61   Pulse 71   Temp 97.9  F (36.6  C)   Resp 18   Ht 1.829 m (6')   Wt 64.1 kg (141 lb 6.4 oz)   SpO2 98%   BMI 19.18 kg/m    Body mass index is 19.18 kg/m .    Review of Systems   ***    Physical Exam  ***    Labs:     Most Recent 3 CBC's:No lab results found.  Most Recent 3 BMP's:No lab results found.           Assessment/Plan:  {fgsdia}        Electronically signed by:Toya Vang***    MEDICATIONS:  MED REC REQUIRED{TIP  Click the link below to document or use med rec list, use list to pull in response :[id number]}  Post Medication Reconciliation Status: {MED REC LIST:213866}

## 2025-07-08 ENCOUNTER — TRANSITIONAL CARE UNIT VISIT (OUTPATIENT)
Dept: GERIATRICS | Facility: CLINIC | Age: 75
End: 2025-07-08
Payer: COMMERCIAL

## 2025-07-08 VITALS
RESPIRATION RATE: 18 BRPM | DIASTOLIC BLOOD PRESSURE: 61 MMHG | OXYGEN SATURATION: 98 % | BODY MASS INDEX: 19.15 KG/M2 | TEMPERATURE: 97.9 F | WEIGHT: 141.4 LBS | HEART RATE: 71 BPM | SYSTOLIC BLOOD PRESSURE: 100 MMHG | HEIGHT: 72 IN

## 2025-07-09 ENCOUNTER — TELEPHONE (OUTPATIENT)
Dept: GERIATRICS | Facility: CLINIC | Age: 75
End: 2025-07-09
Payer: COMMERCIAL

## 2025-07-09 DIAGNOSIS — F90.0 ADHD, PREDOMINANTLY INATTENTIVE TYPE: ICD-10-CM

## 2025-07-09 RX ORDER — DEXTROAMPHETAMINE SACCHARATE, AMPHETAMINE ASPARTATE, DEXTROAMPHETAMINE SULFATE AND AMPHETAMINE SULFATE 5; 5; 5; 5 MG/1; MG/1; MG/1; MG/1
TABLET ORAL
Qty: 30 TABLET | Refills: 0 | Status: SHIPPED | OUTPATIENT
Start: 2025-07-09

## 2025-07-09 NOTE — TELEPHONE ENCOUNTER
NP called in and wants to start Adderall 20 mg in AM and keep PRN as is.      Verbal order/direction given to: Joey Edwards RN

## 2025-07-21 ENCOUNTER — TRANSITIONAL CARE UNIT VISIT (OUTPATIENT)
Dept: GERIATRICS | Facility: CLINIC | Age: 75
End: 2025-07-21
Payer: COMMERCIAL

## 2025-07-21 VITALS
OXYGEN SATURATION: 100 % | RESPIRATION RATE: 18 BRPM | WEIGHT: 145.8 LBS | TEMPERATURE: 97.7 F | HEIGHT: 72 IN | SYSTOLIC BLOOD PRESSURE: 105 MMHG | HEART RATE: 53 BPM | DIASTOLIC BLOOD PRESSURE: 59 MMHG | BODY MASS INDEX: 19.75 KG/M2

## 2025-07-21 DIAGNOSIS — R41.89 COGNITIVE IMPAIRMENT: ICD-10-CM

## 2025-07-21 DIAGNOSIS — G47.33 OBSTRUCTIVE SLEEP APNEA: ICD-10-CM

## 2025-07-21 DIAGNOSIS — F33.2 SEVERE EPISODE OF RECURRENT MAJOR DEPRESSIVE DISORDER, WITHOUT PSYCHOTIC FEATURES (H): ICD-10-CM

## 2025-07-21 DIAGNOSIS — F10.20 ALCOHOL DEPENDENCE, UNCOMPLICATED (H): ICD-10-CM

## 2025-07-21 DIAGNOSIS — I63.9 CEREBELLAR STROKE (H): Primary | ICD-10-CM

## 2025-07-21 DIAGNOSIS — I10 BENIGN ESSENTIAL HYPERTENSION: ICD-10-CM

## 2025-07-21 DIAGNOSIS — F90.0 ADHD, PREDOMINANTLY INATTENTIVE TYPE: ICD-10-CM

## 2025-07-21 DIAGNOSIS — F17.200 SMOKER: ICD-10-CM

## 2025-07-21 DIAGNOSIS — F41.1 GAD (GENERALIZED ANXIETY DISORDER): ICD-10-CM

## 2025-07-21 PROCEDURE — 99306 1ST NF CARE HIGH MDM 50: CPT | Performed by: INTERNAL MEDICINE

## 2025-07-28 ENCOUNTER — LAB REQUISITION (OUTPATIENT)
Dept: LAB | Facility: CLINIC | Age: 75
End: 2025-07-28
Payer: COMMERCIAL

## 2025-07-28 ENCOUNTER — DISCHARGE SUMMARY NURSING HOME (OUTPATIENT)
Dept: GERIATRICS | Facility: CLINIC | Age: 75
End: 2025-07-28
Payer: COMMERCIAL

## 2025-07-28 VITALS
BODY MASS INDEX: 19.84 KG/M2 | SYSTOLIC BLOOD PRESSURE: 111 MMHG | RESPIRATION RATE: 18 BRPM | TEMPERATURE: 97 F | HEART RATE: 60 BPM | OXYGEN SATURATION: 98 % | DIASTOLIC BLOOD PRESSURE: 59 MMHG | WEIGHT: 146.5 LBS | HEIGHT: 72 IN

## 2025-07-28 DIAGNOSIS — G62.9 PERIPHERAL POLYNEUROPATHY: ICD-10-CM

## 2025-07-28 DIAGNOSIS — G24.01 TARDIVE DYSKINESIA: ICD-10-CM

## 2025-07-28 DIAGNOSIS — Z86.73 HISTORY OF CVA (CEREBROVASCULAR ACCIDENT): Primary | ICD-10-CM

## 2025-07-28 DIAGNOSIS — G47.00 INSOMNIA, UNSPECIFIED TYPE: ICD-10-CM

## 2025-07-28 DIAGNOSIS — F31.4 SEVERE BIPOLAR I DISORDER, CURRENT OR MOST RECENT EPISODE DEPRESSED (H): ICD-10-CM

## 2025-07-28 DIAGNOSIS — F10.20 UNCOMPLICATED ALCOHOL DEPENDENCE (H): ICD-10-CM

## 2025-07-28 DIAGNOSIS — D64.9 ACUTE ON CHRONIC ANEMIA: ICD-10-CM

## 2025-07-28 DIAGNOSIS — F41.1 GAD (GENERALIZED ANXIETY DISORDER): ICD-10-CM

## 2025-07-28 DIAGNOSIS — E43 SEVERE PROTEIN-CALORIE MALNUTRITION: ICD-10-CM

## 2025-07-28 DIAGNOSIS — B00.9 HERPES SIMPLEX VIRUS INFECTION: ICD-10-CM

## 2025-07-28 DIAGNOSIS — C83.00 SMALL LYMPHOCYTIC LYMPHOMA (H): ICD-10-CM

## 2025-07-28 DIAGNOSIS — E78.5 HYPERLIPIDEMIA, UNSPECIFIED: ICD-10-CM

## 2025-07-28 DIAGNOSIS — R53.1 GENERALIZED WEAKNESS: ICD-10-CM

## 2025-07-28 DIAGNOSIS — F33.2 SEVERE EPISODE OF RECURRENT MAJOR DEPRESSIVE DISORDER, WITHOUT PSYCHOTIC FEATURES (H): ICD-10-CM

## 2025-07-28 DIAGNOSIS — J44.9 CHRONIC OBSTRUCTIVE PULMONARY DISEASE, UNSPECIFIED COPD TYPE (H): ICD-10-CM

## 2025-07-28 DIAGNOSIS — F31.31 BIPOLAR AFFECTIVE DISORDER, CURRENTLY DEPRESSED, MILD (H): ICD-10-CM

## 2025-07-28 PROCEDURE — 99316 NF DSCHRG MGMT 30 MIN+: CPT | Performed by: NURSE PRACTITIONER

## 2025-07-28 RX ORDER — LOSARTAN POTASSIUM 25 MG/1
25 TABLET ORAL DAILY
COMMUNITY

## 2025-07-28 RX ORDER — ACETAMINOPHEN 500 MG
1000 TABLET ORAL EVERY 6 HOURS
Status: SHIPPED
Start: 2025-07-28 | End: 2025-07-28

## 2025-07-28 NOTE — PROGRESS NOTES
[facility] GERIATRICS DISCHARGE SUMMARY  PATIENT'S NAME: Benson Renae  YOB: 1950  MEDICAL RECORD NUMBER:  [id number]  Place of Service where encounter took place:  [facility] (TCU) [[id number]]    PRIMARY CARE PROVIDER AND CLINIC RESPONSIBLE AFTER TRANSFER:   Hever Senior MD, [facility] [address] [facility]*    [facility] Provider     Transferring providers: Caroline GAUTAM CNP; Christina Vigil MD  Recent Hospitalization/ED:  [facility]  stay 6/21/25 to 6/27/25.  Date of SNF Admission: June 27, 2025  Date of SNF (anticipated) Discharge: 07/28/25  Discharged to: new assisted living for patient - [facility]   Cognitive Scores: SLUMS: 22/30 and CPT: 4.5/5.6  Physical Function:  Patient Min assist of one with bathing. SBA with upper body dressing, lower body dressing, grooming, toileting, ambulation, and transfers. MOD (I) with meals.  DME: No new DME needed    CODE STATUS/ADVANCE DIRECTIVES DISCUSSION: Full code   ALLERGIES: Sulfamethoxazole-trimethoprim and Tolterodine         His past medical history includes  ischemic stroke  Uncomplicated alcohol dependence  Major depressive disorder,   Generalized anxiety disorder  bipolar disorder   Tardive dyskinesia  Insomnia  - Tobacco abuse  - ADHD, predominantly inattentive type  - Peripheral polyneuropathy  - Essential hypertension  - Small lymphocytic lymphoma  - Severe protein-calorie malnutrition  - Acute on chronic anemia     On 6/21, he presented with a 24 hour hx of nausea and vomiting and dizziness and unable to stand and ambulate without assistance with tingling in the BLE L>R, but normally this is only on the left side. .  Started Geodon the week prior.   He was found to have a acute ischemic infarction in the right cerebellum          NURSING FACILITY COURSE   Medication Changes/Rationale:   Summary of nursing facility stay:     (Z86.73) History of CVA (cerebrovascular  accident)  (primary encounter diagnosis)  Comment: with vertigo, gait instability and found to have small cerebellar stroke on MRI. Likely cardioembolic.   Plan:   DAPT x3 weeks then ASA indefinitely   lipitor 10 >> 40 mg with goal LDL < 70   ziopatch on discharge   smoking cessation   Therapy to evaluate and treat  Monitor BMP, CBC, lipids     (E43) Severe protein-calorie malnutrition  (R53.81) Physical deconditioning  (R53.1) Generalized weakness  (R26.9) Abnormal gait  Comment: chronic   Uses a walker for distance at [facility]  Gained weight during TCU stay.  Current weight 146 pounds.     (F10.20) Uncomplicated alcohol dependence (H)  Comment: chronic  Takes Naltrexone.   Has not consumed alcohol             (F41.1) NIA (generalized anxiety disorder)  (F90.0) ADHD, predominantly inattentive type  (F41.0) Panic disorder  (F31.4) Severe bipolar I disorder, current or most recent episode depressed (H)  Comment: chronic  Takes adderall - but not regularly and doesn't think he needs it at the TCU   Taking Adderall daily  Effexor 225 mg daily   Geodone discontinued  Seeing ACP     (G24.01) Tardive dyskinesia  Comment: chronic   Takes valbenazine 80 mg daily           (G47.00) Insomnia, unspecified type  Comment: chronic  Melatonin  Was taking Ativan to help sleep but discontinued due to lethargy     [J44.9] Chronic obstructive pulmonary disease, unspecified COPD type (H)  (Z72.0) Tobacco abuse  Comment:  chronic  Quit smoking        (G62.9) Peripheral polyneuropathy  Comment: chronic  Takes gabapentin          (I10) Benign essential hypertension  Comment: chronic  Controlled  Discontinue losartan     (C83.00) Small lymphocytic lymphoma (H)  Comment: Managed outpatient with the primary care provider.   Need a plan for follow-up        (D64.9) Acute on chronic anemia  Comment: acute on chronic  Hgb 10 (6/24)   Labs needed      [B00.9]Herpes simplex virus infection   Comment: per hospital note - Cluster or blisters with  erythematus base on buttock close to anus area   positive for HSV   Took valacyclovir in July 2025      Discharge Medications:  MED REC REQUIRED  Post Medication Reconciliation Status:  Discharge medications reconciled and changed, see notes/orders       Current Outpatient Medications   Medication Sig Dispense Refill    acetaminophen (TYLENOL) 500 MG tablet Take 2 tablets (1,000 mg) by mouth every 6 hours.      albuterol (PROAIR HFA/PROVENTIL HFA/VENTOLIN HFA) 108 (90 Base) MCG/ACT inhaler Inhale 1 puff into the lungs every 6 hours as needed for shortness of breath.      amphetamine-dextroamphetamine (ADDERALL) 20 MG tablet Take 1 tablet (20 mg) by mouth every morning. May also take 1 tablet (20 mg) daily as needed (when he needs to focus). 30 tablet 0    aspirin (ASA) 81 MG chewable tablet Take 81 mg by mouth daily.      atorvastatin (LIPITOR) 40 MG tablet Take 1 tablet (40 mg) by mouth daily.      calcium carbonate antacid 1000 MG CHEW Take 1 tablet by mouth as needed for heartburn.      folic acid (FOLVITE) 1 MG tablet TAKE ONE TABLET BY MOUTH ONE TIME DAILY      gabapentin (NEURONTIN) 300 MG capsule Take 300 mg by mouth at bedtime.      losartan (COZAAR) 25 MG tablet Take 25 mg by mouth daily.      melatonin 3 MG tablet Take 3 mg by mouth nightly as needed for sleep.      naltrexone (DEPADE/REVIA) 50 MG tablet Take 1 tablet by mouth daily.      nicotine (NICODERM CQ) 14 MG/24HR 24 hr patch Place 1 patch onto the skin as needed for nicotine withdrawal symptoms.      ondansetron (ZOFRAN ODT) 4 MG ODT tab Take 1 tablet (4 mg) by mouth every 8 hours as needed for nausea.      pantoprazole (PROTONIX) 40 MG EC tablet Take 40 mg by mouth daily.      polyethylene glycol (MIRALAX) 17 GM/Dose powder Take 17 g by mouth daily as needed for constipation.      SENNA-docusate sodium (SENNA S) 8.6-50 MG tablet Take 1 tablet by mouth at bedtime.      Valbenazine Tosylate 80 MG CAPS Take 1 capsule by mouth daily.      venlafaxine  (EFFEXOR XR) 75 MG 24 hr capsule Take 3 capsules (225 mg) by mouth daily. Effexor +75 mg            Controlled medications:   Medication: Adderall , remaining tabs given to patient at the time of discharge to take home     Past Medical History:   Past Medical History:   Diagnosis Date    H/O neck surgery 9/19/2019    intermittent hoarseness in a smoker 9/19/2019    Intermittent hoarseness    Tardive dyskinesia 9/19/2019    Wears glasses 9/19/2019     Physical Exam:   Vitals: /59   Pulse 60   Temp 97  F (36.1  C)   Resp 18   Ht 1.829 m (6')   Wt 66.5 kg (146 lb 8 oz)   SpO2 98%   BMI 19.87 kg/m    BMI: Body mass index is 19.87 kg/m .  GENERAL APPEARANCE:  Alert, in no distress  RESP:  lungs clear to auscultation , no respiratory distress  CV:  rate-normal  PSYCH:  flat     SNF labs: Most Recent 3 CBC's:No lab results found.  Most Recent 3 BMP's:No lab results found.    DISCHARGE PLAN:  Follow up labs: BMP and CBC due 1 to 2 weeks to be drawn by home care with results to primary care  Medical Follow Up:      Follow up with primary care provider in 1 to 2 weeks weeks  Follow up with specialist psychiatry  [facility] scheduled appointments: None  Discharge Services: Home Care:  Occupational Therapy, Physical Therapy, Registered Nurse, and Home Health Aide  Discharge Instructions Verbalized to Patient at Discharge:   None    TOTAL DISCHARGE TIME:   Greater than 30 minutes  Electronically signed by:      LOTUS Wagner CNP on 7/28/2025 at 7:42 PM      Documentation of Face to Face and Certification for Home Health Services    I certify that patient: Benson Renae is under my care and that I, or a nurse practitioner or physician's assistant working with me, had a face-to-face encounter that meets the physician face-to-face encounter requirements with this patient on: 7/28/2025.    This encounter with the patient was in whole, or in part, for the following medical condition, which is  the primary reason for home health care:        Generalized weakness  History of CVA (cerebrovascular accident)  Severe episode of recurrent major depressive disorder, without psychotic features (H)  NIA (generalized anxiety disorder)  Bipolar affective disorder, currently depressed, mild (H)  Tardive dyskinesia  Severe bipolar I disorder, current or most recent episode depressed (H)  Small lymphocytic lymphoma (H)  Severe protein-calorie malnutrition  Peripheral polyneuropathy  Herpes simplex virus infection  Uncomplicated alcohol dependence (H)  Insomnia, unspecified type  Chronic obstructive pulmonary disease, unspecified COPD type (H)  Acute on chronic anemia  .    I certify that, based on my findings, the following services are medically necessary home health services: Nursing, Occupational Therapy, and Physical Therapy.    My clinical findings support the need for the above services because: Nurse is needed: To provide caregiver training to assist with: Medication distribution, activities.., Occupational Therapy Services are needed to assess and treat cognitive ability and address ADL safety due to impairment in motivation, endurance, balance, ADLs., and Physical Therapy Services are needed to assess and treat the following functional impairments: Motivation, endurance, balance,.    Further, I certify that my clinical findings support that this patient is homebound (i.e. absences from home require considerable and taxing effort and are for medical reasons or [facility] services or infrequently or of short duration when for other reasons) because: Mental health symptoms including: Mental health condition is exacerbated by exposure to crowds, unfamiliar environment or new stressful situations...    Based on the above findings. I certify that this patient is confined to the home and needs intermittent skilled nursing care, physical therapy and/or speech therapy.  The patient is under my care, and I have initiated  the establishment of the plan of care.  This patient will be followed by a physician who will periodically review the plan of care.  Physician/Provider to provide follow up care: Hever Senior    Attending hospital physician (the Medicare certified [facility] provider): LOTUS Wagner CNP  Physician Signature: See electronic signature associated with these discharge orders.  Date: 7/28/2025

## 2025-07-29 ENCOUNTER — DOCUMENTATION ONLY (OUTPATIENT)
Dept: GERIATRICS | Facility: CLINIC | Age: 75
End: 2025-07-29
Payer: COMMERCIAL

## 2025-07-29 ENCOUNTER — LAB REQUISITION (OUTPATIENT)
Dept: LAB | Facility: CLINIC | Age: 75
End: 2025-07-29
Payer: COMMERCIAL

## 2025-07-29 DIAGNOSIS — E78.5 HYPERLIPIDEMIA, UNSPECIFIED: ICD-10-CM

## 2025-07-29 DIAGNOSIS — E55.9 VITAMIN D DEFICIENCY, UNSPECIFIED: ICD-10-CM

## 2025-07-29 DIAGNOSIS — I10 ESSENTIAL (PRIMARY) HYPERTENSION: ICD-10-CM

## 2025-07-29 NOTE — PROGRESS NOTES
[facility] Geriatrics   2025     Name: Benson Renae   : 1950         Orders:  Discontinue losartan  CBC, CMP, vitamin D, lipid panel next lab day.  Diagnosis history of CVA  Discontinue current acetaminophen order   start acetaminophen 1000 mg by mouth 3 times daily.   Diagnosis discomfort    Electronically signed by     LOTUS Wagner CNP on 2025 at 7:52 PM

## 2025-07-30 ENCOUNTER — ASSISTED LIVING VISIT (OUTPATIENT)
Dept: GERIATRICS | Facility: CLINIC | Age: 75
End: 2025-07-30
Payer: COMMERCIAL

## 2025-07-30 VITALS
RESPIRATION RATE: 16 BRPM | BODY MASS INDEX: 19.33 KG/M2 | OXYGEN SATURATION: 98 % | HEIGHT: 73 IN | DIASTOLIC BLOOD PRESSURE: 73 MMHG | SYSTOLIC BLOOD PRESSURE: 156 MMHG | HEART RATE: 76 BPM | TEMPERATURE: 97.5 F

## 2025-07-30 DIAGNOSIS — Z53.9 ERRONEOUS ENCOUNTER--DISREGARD: Primary | ICD-10-CM

## 2025-07-30 LAB
ALBUMIN SERPL BCG-MCNC: 3.7 G/DL (ref 3.5–5.2)
ALP SERPL-CCNC: 81 U/L (ref 40–150)
ALT SERPL W P-5'-P-CCNC: 14 U/L (ref 0–70)
ANION GAP SERPL CALCULATED.3IONS-SCNC: 10 MMOL/L (ref 7–15)
AST SERPL W P-5'-P-CCNC: 23 U/L (ref 0–45)
BILIRUB SERPL-MCNC: <0.2 MG/DL
BUN SERPL-MCNC: 20.3 MG/DL (ref 8–23)
CALCIUM SERPL-MCNC: 10.1 MG/DL (ref 8.8–10.4)
CHLORIDE SERPL-SCNC: 101 MMOL/L (ref 98–107)
CHOLEST SERPL-MCNC: 146 MG/DL
CREAT SERPL-MCNC: 0.64 MG/DL (ref 0.67–1.17)
EGFRCR SERPLBLD CKD-EPI 2021: >90 ML/MIN/1.73M2
ERYTHROCYTE [DISTWIDTH] IN BLOOD BY AUTOMATED COUNT: 15 % (ref 10–15)
FASTING STATUS PATIENT QL REPORTED: ABNORMAL
GLUCOSE SERPL-MCNC: 99 MG/DL (ref 70–99)
HCO3 SERPL-SCNC: 27 MMOL/L (ref 22–29)
HCT VFR BLD AUTO: 35.5 % (ref 40–53)
HDLC SERPL-MCNC: 38 MG/DL
HGB BLD-MCNC: 10.7 G/DL (ref 13.3–17.7)
LDLC SERPL CALC-MCNC: 86 MG/DL
MCH RBC QN AUTO: 29.4 PG (ref 26.5–33)
MCHC RBC AUTO-ENTMCNC: 30.1 G/DL (ref 31.5–36.5)
MCV RBC AUTO: 98 FL (ref 78–100)
NONHDLC SERPL-MCNC: 108 MG/DL
PLATELET # BLD AUTO: 168 10E3/UL (ref 150–450)
POTASSIUM SERPL-SCNC: 4.2 MMOL/L (ref 3.4–5.3)
PROT SERPL-MCNC: 6.6 G/DL (ref 6.4–8.3)
RBC # BLD AUTO: 3.64 10E6/UL (ref 4.4–5.9)
SODIUM SERPL-SCNC: 138 MMOL/L (ref 135–145)
TRIGL SERPL-MCNC: 108 MG/DL
VIT D+METAB SERPL-MCNC: 76 NG/ML (ref 20–50)
WBC # BLD AUTO: 18.5 10E3/UL (ref 4–11)

## 2025-07-30 PROCEDURE — 80053 COMPREHEN METABOLIC PANEL: CPT | Mod: ORL | Performed by: NURSE PRACTITIONER

## 2025-07-30 PROCEDURE — 80061 LIPID PANEL: CPT | Mod: ORL | Performed by: NURSE PRACTITIONER

## 2025-07-30 PROCEDURE — 36415 COLL VENOUS BLD VENIPUNCTURE: CPT | Mod: ORL | Performed by: NURSE PRACTITIONER

## 2025-07-30 PROCEDURE — 85027 COMPLETE CBC AUTOMATED: CPT | Mod: ORL | Performed by: NURSE PRACTITIONER

## 2025-07-30 PROCEDURE — 82306 VITAMIN D 25 HYDROXY: CPT | Mod: ORL | Performed by: NURSE PRACTITIONER

## 2025-07-30 NOTE — PROGRESS NOTES
[facility] GERIATRICS    PRIMARY CARE PROVIDER AND CLINIC:  LOTUS Wagner CNP, [address] [facility] [address]  Chief Complaint   Patient presents with    [facility] Medical Record Number:  [id number]  Place of Service where encounter took place:  [facility] ([facility] [[id number]]    Benson eRnae  is a 75 year old  (1950), admitted to the above facility from  [facility] ([facility]). Hospital stay 6/27/25 through 7/28/25..       Adjusting but remains in room most of the time. Needs a lot of convincing to go to the dining room      CODE STATUS/ADVANCE DIRECTIVES DISCUSSION:  No Order  CPR/Full code   ALLERGIES:   Allergies   Allergen Reactions    Sulfamethoxazole-Trimethoprim Nausea    Tolterodine Other (See Comments)     PN: nausea       PAST MEDICAL HISTORY:   Past Medical History:   Diagnosis Date    H/O neck surgery 9/19/2019    intermittent hoarseness in a smoker 9/19/2019    Intermittent hoarseness    Tardive dyskinesia 9/19/2019    Wears glasses 9/19/2019      PAST SURGICAL HISTORY:   has a past surgical history that includes Prostate surgery; Neck surgery; knee surgery (Left); tonsillectomy; ------------other------------- (Left); and IR Lumbar Puncture (8/9/2024).  FAMILY HISTORY: family history includes Alzheimer Disease in his father; Anxiety Disorder in his mother; Depression in his mother; Other - See Comments in his daughter, sister, sister, and son.  SOCIAL HISTORY:   reports that he has been smoking. He has never used smokeless tobacco. He reports that he does not currently use alcohol.  Patient's living condition: lives in an assisted living facility    Post Discharge Medication Reconciliation Status:   MED REC REQUIRED{TIP  Click the link below to document or use med rec list, use list to pull in response :[id number]}  Post Medication Reconciliation Status:  Medication reconciliation previously completed during another office visit       Current  Outpatient Medications   Medication Sig Dispense Refill    albuterol (PROAIR HFA/PROVENTIL HFA/VENTOLIN HFA) 108 (90 Base) MCG/ACT inhaler Inhale 1 puff into the lungs every 6 hours as needed for shortness of breath.      amphetamine-dextroamphetamine (ADDERALL) 20 MG tablet Take 1 tablet (20 mg) by mouth every morning. May also take 1 tablet (20 mg) daily as needed (when he needs to focus). 30 tablet 0    aspirin (ASA) 81 MG chewable tablet Take 81 mg by mouth daily.      atorvastatin (LIPITOR) 40 MG tablet Take 1 tablet (40 mg) by mouth daily.      calcium carbonate antacid 1000 MG CHEW Take 1 tablet by mouth as needed for heartburn.      folic acid (FOLVITE) 1 MG tablet TAKE ONE TABLET BY MOUTH ONE TIME DAILY      gabapentin (NEURONTIN) 300 MG capsule Take 300 mg by mouth at bedtime.      losartan (COZAAR) 25 MG tablet Take 25 mg by mouth daily.      melatonin 3 MG tablet Take 3 mg by mouth nightly as needed for sleep.      naltrexone (DEPADE/REVIA) 50 MG tablet Take 1 tablet by mouth daily.      nicotine (NICODERM CQ) 14 MG/24HR 24 hr patch Place 1 patch onto the skin as needed for nicotine withdrawal symptoms.      ondansetron (ZOFRAN ODT) 4 MG ODT tab Take 1 tablet (4 mg) by mouth every 8 hours as needed for nausea.      pantoprazole (PROTONIX) 40 MG EC tablet Take 40 mg by mouth daily.      polyethylene glycol (MIRALAX) 17 GM/Dose powder Take 17 g by mouth daily as needed for constipation.      SENNA-docusate sodium (SENNA S) 8.6-50 MG tablet Take 1 tablet by mouth at bedtime.      Valbenazine Tosylate 80 MG CAPS Take 1 capsule by mouth daily.      venlafaxine (EFFEXOR XR) 75 MG 24 hr capsule Take 3 capsules (225 mg) by mouth daily. Effexor +75 mg       No current facility-administered medications for this visit.       ROS:  4 point ROS including Respiratory, CV, GI and , other than that noted in the HPI,  is negative    Vitals:  BP (!) 156/73   Pulse 76   Temp 97.5  F (36.4  C)   Resp 16   Ht  "1.854 m (6' 1\")   SpO2 98%   BMI 19.33 kg/m    Exam:  GENERAL APPEARANCE:  Alert, in no distress  RESP:  no respiratory distress  PSYCH:  affect abnormal flat, depressed    Lab/Diagnostic data:  Most Recent 3 CBC's:  Recent Labs   Lab Test 07/30/25  0510   WBC 18.5*   HGB 10.7*   MCV 98        Most Recent 3 BMP's:  Recent Labs   Lab Test 07/30/25  0510      POTASSIUM 4.2   CHLORIDE 101   CO2 27   BUN 20.3   CR 0.64*   ANIONGAP 10   CAITY 10.1   GLC 99       ASSESSMENT/PLAN:    {FGS DX2:415170}        Electronically signed by:                 "

## 2025-07-31 DIAGNOSIS — F90.0 ADHD, PREDOMINANTLY INATTENTIVE TYPE: ICD-10-CM

## 2025-07-31 RX ORDER — DEXTROAMPHETAMINE SACCHARATE, AMPHETAMINE ASPARTATE, DEXTROAMPHETAMINE SULFATE AND AMPHETAMINE SULFATE 5; 5; 5; 5 MG/1; MG/1; MG/1; MG/1
TABLET ORAL
Qty: 30 TABLET | Refills: 0 | Status: SHIPPED | OUTPATIENT
Start: 2025-07-31

## 2025-08-04 ENCOUNTER — PATIENT OUTREACH (OUTPATIENT)
Dept: CARE COORDINATION | Facility: CLINIC | Age: 75
End: 2025-08-04
Payer: COMMERCIAL

## 2025-08-06 ENCOUNTER — LAB REQUISITION (OUTPATIENT)
Dept: LAB | Facility: CLINIC | Age: 75
End: 2025-08-06
Payer: COMMERCIAL

## 2025-08-06 ENCOUNTER — PATIENT OUTREACH (OUTPATIENT)
Dept: CARE COORDINATION | Facility: CLINIC | Age: 75
End: 2025-08-06

## 2025-08-06 DIAGNOSIS — D51.9 VITAMIN B12 DEFICIENCY ANEMIA, UNSPECIFIED: ICD-10-CM

## 2025-08-06 DIAGNOSIS — D64.9 ANEMIA, UNSPECIFIED: ICD-10-CM

## 2025-08-07 ENCOUNTER — RESULTS FOLLOW-UP (OUTPATIENT)
Dept: GERIATRICS | Facility: CLINIC | Age: 75
End: 2025-08-07
Payer: COMMERCIAL

## 2025-08-07 LAB
ERYTHROCYTE [DISTWIDTH] IN BLOOD BY AUTOMATED COUNT: 15 % (ref 10–15)
FOLATE SERPL-MCNC: 25.1 NG/ML (ref 4.6–34.8)
HCT VFR BLD AUTO: 32.7 % (ref 40–53)
HGB BLD-MCNC: 10.1 G/DL (ref 13.3–17.7)
IRON BINDING CAPACITY (ROCHE): 325 UG/DL (ref 240–430)
IRON SATN MFR SERPL: 13 % (ref 15–46)
IRON SERPL-MCNC: 43 UG/DL (ref 61–157)
MCH RBC QN AUTO: 28.9 PG (ref 26.5–33)
MCHC RBC AUTO-ENTMCNC: 30.9 G/DL (ref 31.5–36.5)
MCV RBC AUTO: 94 FL (ref 78–100)
PLATELET # BLD AUTO: 148 10E3/UL (ref 150–450)
RBC # BLD AUTO: 3.49 10E6/UL (ref 4.4–5.9)
VIT B12 SERPL-MCNC: 300 PG/ML (ref 232–1245)
WBC # BLD AUTO: 15.9 10E3/UL (ref 4–11)

## 2025-08-07 PROCEDURE — 82746 ASSAY OF FOLIC ACID SERUM: CPT | Mod: ORL | Performed by: NURSE PRACTITIONER

## 2025-08-07 PROCEDURE — 36415 COLL VENOUS BLD VENIPUNCTURE: CPT | Mod: ORL | Performed by: NURSE PRACTITIONER

## 2025-08-07 PROCEDURE — 83550 IRON BINDING TEST: CPT | Mod: ORL | Performed by: NURSE PRACTITIONER

## 2025-08-07 PROCEDURE — 85027 COMPLETE CBC AUTOMATED: CPT | Mod: ORL | Performed by: NURSE PRACTITIONER

## 2025-08-07 PROCEDURE — P9604 ONE-WAY ALLOW PRORATED TRIP: HCPCS | Mod: ORL | Performed by: NURSE PRACTITIONER

## 2025-08-07 PROCEDURE — 82607 VITAMIN B-12: CPT | Mod: ORL | Performed by: NURSE PRACTITIONER

## 2025-08-07 RX ORDER — MULTIVITAMIN WITH IRON
500 TABLET ORAL DAILY
COMMUNITY
Start: 2025-08-07

## 2025-08-13 ENCOUNTER — ASSISTED LIVING VISIT (OUTPATIENT)
Dept: GERIATRICS | Facility: CLINIC | Age: 75
End: 2025-08-13
Payer: COMMERCIAL

## 2025-08-13 VITALS
SYSTOLIC BLOOD PRESSURE: 119 MMHG | DIASTOLIC BLOOD PRESSURE: 63 MMHG | RESPIRATION RATE: 18 BRPM | HEART RATE: 70 BPM | BODY MASS INDEX: 19.38 KG/M2 | TEMPERATURE: 98.4 F | WEIGHT: 146.2 LBS | OXYGEN SATURATION: 96 % | HEIGHT: 73 IN

## 2025-08-13 DIAGNOSIS — G62.9 PERIPHERAL POLYNEUROPATHY: ICD-10-CM

## 2025-08-13 DIAGNOSIS — R53.81 PHYSICAL DECONDITIONING: ICD-10-CM

## 2025-08-13 DIAGNOSIS — F41.1 GAD (GENERALIZED ANXIETY DISORDER): ICD-10-CM

## 2025-08-13 DIAGNOSIS — D64.9 ACUTE ON CHRONIC ANEMIA: ICD-10-CM

## 2025-08-13 DIAGNOSIS — G47.00 INSOMNIA, UNSPECIFIED TYPE: ICD-10-CM

## 2025-08-13 DIAGNOSIS — B00.9 HERPES SIMPLEX VIRUS INFECTION: ICD-10-CM

## 2025-08-13 DIAGNOSIS — R26.9 ABNORMAL GAIT: ICD-10-CM

## 2025-08-13 DIAGNOSIS — C83.00 SMALL LYMPHOCYTIC LYMPHOMA (H): ICD-10-CM

## 2025-08-13 DIAGNOSIS — R53.1 GENERALIZED WEAKNESS: ICD-10-CM

## 2025-08-13 DIAGNOSIS — F10.20 ALCOHOL DEPENDENCE, UNCOMPLICATED (H): ICD-10-CM

## 2025-08-13 DIAGNOSIS — G24.01 TARDIVE DYSKINESIA: ICD-10-CM

## 2025-08-13 DIAGNOSIS — F90.0 ADHD, PREDOMINANTLY INATTENTIVE TYPE: ICD-10-CM

## 2025-08-13 DIAGNOSIS — J44.9 CHRONIC OBSTRUCTIVE PULMONARY DISEASE, UNSPECIFIED COPD TYPE (H): ICD-10-CM

## 2025-08-13 DIAGNOSIS — F31.31 BIPOLAR AFFECTIVE DISORDER, CURRENTLY DEPRESSED, MILD (H): ICD-10-CM

## 2025-08-13 DIAGNOSIS — F31.4 SEVERE BIPOLAR I DISORDER, CURRENT OR MOST RECENT EPISODE DEPRESSED (H): ICD-10-CM

## 2025-08-13 DIAGNOSIS — Z86.73 HISTORY OF CVA (CEREBROVASCULAR ACCIDENT): ICD-10-CM

## 2025-08-13 DIAGNOSIS — I10 BENIGN ESSENTIAL HYPERTENSION: ICD-10-CM

## 2025-08-13 DIAGNOSIS — F31.4 BIPOLAR DISORDER, CURRENT EPISODE DEPRESSED, SEVERE, WITHOUT PSYCHOTIC FEATURES (H): ICD-10-CM

## 2025-08-13 DIAGNOSIS — F33.2 SEVERE EPISODE OF RECURRENT MAJOR DEPRESSIVE DISORDER, WITHOUT PSYCHOTIC FEATURES (H): Primary | ICD-10-CM

## 2025-08-13 DIAGNOSIS — E43 SEVERE PROTEIN-CALORIE MALNUTRITION: ICD-10-CM

## 2025-08-13 PROCEDURE — 99349 HOME/RES VST EST MOD MDM 40: CPT | Performed by: NURSE PRACTITIONER

## 2025-08-17 RX ORDER — ACETAMINOPHEN 500 MG
500-1000 TABLET ORAL 3 TIMES DAILY
COMMUNITY

## 2025-08-17 RX ORDER — VENLAFAXINE HYDROCHLORIDE 150 MG/1
150 CAPSULE, EXTENDED RELEASE ORAL DAILY
Status: SHIPPED
Start: 2025-08-17

## 2025-08-17 RX ORDER — NALTREXONE HYDROCHLORIDE 50 MG/1
50 TABLET, FILM COATED ORAL DAILY
Status: SHIPPED
Start: 2025-08-17 | End: 2025-08-25

## 2025-08-21 ENCOUNTER — ASSISTED LIVING VISIT (OUTPATIENT)
Dept: GERIATRICS | Facility: CLINIC | Age: 75
End: 2025-08-21
Payer: COMMERCIAL

## 2025-08-21 VITALS
SYSTOLIC BLOOD PRESSURE: 127 MMHG | RESPIRATION RATE: 16 BRPM | DIASTOLIC BLOOD PRESSURE: 75 MMHG | HEART RATE: 67 BPM | HEIGHT: 73 IN | TEMPERATURE: 97.3 F | OXYGEN SATURATION: 95 % | BODY MASS INDEX: 19.46 KG/M2 | WEIGHT: 146.8 LBS

## 2025-08-21 DIAGNOSIS — R53.1 GENERALIZED WEAKNESS: ICD-10-CM

## 2025-08-21 DIAGNOSIS — F90.0 ADHD, PREDOMINANTLY INATTENTIVE TYPE: ICD-10-CM

## 2025-08-21 DIAGNOSIS — F41.1 GAD (GENERALIZED ANXIETY DISORDER): ICD-10-CM

## 2025-08-21 DIAGNOSIS — E43 SEVERE PROTEIN-CALORIE MALNUTRITION: ICD-10-CM

## 2025-08-21 DIAGNOSIS — F33.2 SEVERE EPISODE OF RECURRENT MAJOR DEPRESSIVE DISORDER, WITHOUT PSYCHOTIC FEATURES (H): ICD-10-CM

## 2025-08-21 DIAGNOSIS — R53.81 PHYSICAL DECONDITIONING: ICD-10-CM

## 2025-08-21 DIAGNOSIS — F10.20 ALCOHOL DEPENDENCE, UNCOMPLICATED (H): Primary | ICD-10-CM

## 2025-08-21 DIAGNOSIS — F31.4 SEVERE BIPOLAR I DISORDER, CURRENT OR MOST RECENT EPISODE DEPRESSED (H): ICD-10-CM

## 2025-08-21 DIAGNOSIS — R26.9 ABNORMAL GAIT: ICD-10-CM

## 2025-08-21 PROCEDURE — 99349 HOME/RES VST EST MOD MDM 40: CPT | Performed by: NURSE PRACTITIONER

## 2025-08-22 DIAGNOSIS — K59.00 CONSTIPATION, UNSPECIFIED CONSTIPATION TYPE: ICD-10-CM

## 2025-08-22 DIAGNOSIS — Z86.73 HISTORY OF CVA (CEREBROVASCULAR ACCIDENT): Primary | ICD-10-CM

## 2025-08-25 RX ORDER — DOCUSATE SODIUM 50 MG AND SENNOSIDES 8.6 MG 8.6; 5 MG/1; MG/1
TABLET, FILM COATED ORAL
Qty: 90 TABLET | Refills: 3 | Status: SHIPPED | OUTPATIENT
Start: 2025-08-25

## 2025-08-25 RX ORDER — ASPIRIN 81 MG
81 TABLET,CHEWABLE ORAL DAILY
Qty: 90 TABLET | Refills: 3 | Status: SHIPPED | OUTPATIENT
Start: 2025-08-25

## 2025-09-03 LAB
CHOLEST SERPL-MCNC: 150 MG/DL
HDLC SERPL-MCNC: 38 MG/DL
NONHDLC SERPL-MCNC: 112 MG/DL